# Patient Record
Sex: MALE | Race: WHITE | NOT HISPANIC OR LATINO | Employment: OTHER | URBAN - METROPOLITAN AREA
[De-identification: names, ages, dates, MRNs, and addresses within clinical notes are randomized per-mention and may not be internally consistent; named-entity substitution may affect disease eponyms.]

---

## 2023-06-09 PROBLEM — R11.0 NAUSEA: Status: ACTIVE | Noted: 2023-06-09

## 2023-06-09 PROBLEM — G43.909 MIGRAINE WITHOUT STATUS MIGRAINOSUS, NOT INTRACTABLE: Status: ACTIVE | Noted: 2023-06-09

## 2024-05-03 ENCOUNTER — HOSPITAL ENCOUNTER (EMERGENCY)
Facility: HOSPITAL | Age: 34
Discharge: HOME | End: 2024-05-03
Attending: EMERGENCY MEDICINE
Payer: OTHER GOVERNMENT

## 2024-05-03 VITALS
DIASTOLIC BLOOD PRESSURE: 94 MMHG | SYSTOLIC BLOOD PRESSURE: 139 MMHG | HEIGHT: 72 IN | RESPIRATION RATE: 20 BRPM | HEART RATE: 85 BPM | OXYGEN SATURATION: 98 % | TEMPERATURE: 98 F | BODY MASS INDEX: 27.77 KG/M2 | WEIGHT: 205 LBS

## 2024-05-03 DIAGNOSIS — M54.42 CHRONIC BILATERAL LOW BACK PAIN WITH LEFT-SIDED SCIATICA: Primary | ICD-10-CM

## 2024-05-03 DIAGNOSIS — G89.29 CHRONIC BILATERAL LOW BACK PAIN WITH LEFT-SIDED SCIATICA: Primary | ICD-10-CM

## 2024-05-03 PROBLEM — M54.16 LUMBAR RADICULOPATHY: Status: ACTIVE | Noted: 2024-05-03

## 2024-05-03 LAB
ALBUMIN SERPL BCP-MCNC: 4.2 G/DL (ref 3.5–5.2)
ALP SERPL-CCNC: 63 U/L (ref 55–135)
ALT SERPL W/O P-5'-P-CCNC: 37 U/L (ref 10–44)
ANION GAP SERPL CALC-SCNC: 9 MMOL/L (ref 8–16)
AST SERPL-CCNC: 25 U/L (ref 10–40)
BASOPHILS # BLD AUTO: 0.09 K/UL (ref 0–0.2)
BASOPHILS NFR BLD: 1.1 % (ref 0–1.9)
BILIRUB SERPL-MCNC: 0.4 MG/DL (ref 0.1–1)
BUN SERPL-MCNC: 13 MG/DL (ref 6–20)
CALCIUM SERPL-MCNC: 9.5 MG/DL (ref 8.7–10.5)
CHLORIDE SERPL-SCNC: 103 MMOL/L (ref 95–110)
CO2 SERPL-SCNC: 26 MMOL/L (ref 23–29)
CREAT SERPL-MCNC: 0.9 MG/DL (ref 0.5–1.4)
DIFFERENTIAL METHOD BLD: ABNORMAL
EOSINOPHIL # BLD AUTO: 0.9 K/UL (ref 0–0.5)
EOSINOPHIL NFR BLD: 10.5 % (ref 0–8)
ERYTHROCYTE [DISTWIDTH] IN BLOOD BY AUTOMATED COUNT: 13 % (ref 11.5–14.5)
EST. GFR  (NO RACE VARIABLE): >60 ML/MIN/1.73 M^2
GLUCOSE SERPL-MCNC: 101 MG/DL (ref 70–110)
HCT VFR BLD AUTO: 48 % (ref 40–54)
HGB BLD-MCNC: 16 G/DL (ref 14–18)
IMM GRANULOCYTES # BLD AUTO: 0.1 K/UL (ref 0–0.04)
IMM GRANULOCYTES NFR BLD AUTO: 1.2 % (ref 0–0.5)
LYMPHOCYTES # BLD AUTO: 2 K/UL (ref 1–4.8)
LYMPHOCYTES NFR BLD: 23.3 % (ref 18–48)
MCH RBC QN AUTO: 29.2 PG (ref 27–31)
MCHC RBC AUTO-ENTMCNC: 33.3 G/DL (ref 32–36)
MCV RBC AUTO: 88 FL (ref 82–98)
MONOCYTES # BLD AUTO: 0.7 K/UL (ref 0.3–1)
MONOCYTES NFR BLD: 8.2 % (ref 4–15)
NEUTROPHILS # BLD AUTO: 4.7 K/UL (ref 1.8–7.7)
NEUTROPHILS NFR BLD: 55.7 % (ref 38–73)
NRBC BLD-RTO: 0 /100 WBC
PLATELET # BLD AUTO: 347 K/UL (ref 150–450)
PMV BLD AUTO: 9.7 FL (ref 9.2–12.9)
POTASSIUM SERPL-SCNC: 4 MMOL/L (ref 3.5–5.1)
PROT SERPL-MCNC: 7.8 G/DL (ref 6–8.4)
RBC # BLD AUTO: 5.48 M/UL (ref 4.6–6.2)
SODIUM SERPL-SCNC: 138 MMOL/L (ref 136–145)
WBC # BLD AUTO: 8.5 K/UL (ref 3.9–12.7)

## 2024-05-03 PROCEDURE — 25500020 PHARM REV CODE 255: Performed by: EMERGENCY MEDICINE

## 2024-05-03 PROCEDURE — A9585 GADOBUTROL INJECTION: HCPCS | Performed by: EMERGENCY MEDICINE

## 2024-05-03 PROCEDURE — 80053 COMPREHEN METABOLIC PANEL: CPT | Performed by: EMERGENCY MEDICINE

## 2024-05-03 PROCEDURE — 99285 EMERGENCY DEPT VISIT HI MDM: CPT | Mod: 25

## 2024-05-03 PROCEDURE — 99284 EMERGENCY DEPT VISIT MOD MDM: CPT | Mod: ,,, | Performed by: PHYSICIAN ASSISTANT

## 2024-05-03 PROCEDURE — 25000003 PHARM REV CODE 250: Performed by: EMERGENCY MEDICINE

## 2024-05-03 PROCEDURE — 63600175 PHARM REV CODE 636 W HCPCS: Performed by: EMERGENCY MEDICINE

## 2024-05-03 PROCEDURE — 96374 THER/PROPH/DIAG INJ IV PUSH: CPT | Mod: 59

## 2024-05-03 PROCEDURE — 85025 COMPLETE CBC W/AUTO DIFF WBC: CPT | Performed by: EMERGENCY MEDICINE

## 2024-05-03 RX ORDER — HYDROCODONE BITARTRATE AND ACETAMINOPHEN 5; 325 MG/1; MG/1
1 TABLET ORAL
Status: COMPLETED | OUTPATIENT
Start: 2024-05-03 | End: 2024-05-03

## 2024-05-03 RX ORDER — OXYCODONE HYDROCHLORIDE 5 MG/1
10 TABLET ORAL
Status: COMPLETED | OUTPATIENT
Start: 2024-05-03 | End: 2024-05-03

## 2024-05-03 RX ORDER — KETOROLAC TROMETHAMINE 30 MG/ML
10 INJECTION, SOLUTION INTRAMUSCULAR; INTRAVENOUS
Status: COMPLETED | OUTPATIENT
Start: 2024-05-03 | End: 2024-05-03

## 2024-05-03 RX ORDER — ONDANSETRON 4 MG/1
4 TABLET, ORALLY DISINTEGRATING ORAL EVERY 6 HOURS PRN
Qty: 20 TABLET | Refills: 0 | Status: SHIPPED | OUTPATIENT
Start: 2024-05-03

## 2024-05-03 RX ORDER — OXYCODONE HYDROCHLORIDE 10 MG/1
10 TABLET ORAL EVERY 4 HOURS PRN
Qty: 18 TABLET | Refills: 0 | Status: SHIPPED | OUTPATIENT
Start: 2024-05-03

## 2024-05-03 RX ORDER — ACETAMINOPHEN 500 MG
1000 TABLET ORAL
Status: COMPLETED | OUTPATIENT
Start: 2024-05-03 | End: 2024-05-03

## 2024-05-03 RX ORDER — GADOBUTROL 604.72 MG/ML
10 INJECTION INTRAVENOUS
Status: COMPLETED | OUTPATIENT
Start: 2024-05-03 | End: 2024-05-03

## 2024-05-03 RX ADMIN — KETOROLAC TROMETHAMINE 10 MG: 30 INJECTION, SOLUTION INTRAMUSCULAR; INTRAVENOUS at 12:05

## 2024-05-03 RX ADMIN — GADOBUTROL 10 ML: 604.72 INJECTION INTRAVENOUS at 06:05

## 2024-05-03 RX ADMIN — ACETAMINOPHEN 1000 MG: 500 TABLET ORAL at 12:05

## 2024-05-03 RX ADMIN — HYDROCODONE BITARTRATE AND ACETAMINOPHEN 1 TABLET: 5; 325 TABLET ORAL at 08:05

## 2024-05-03 RX ADMIN — HYDROCODONE BITARTRATE AND ACETAMINOPHEN 1 TABLET: 5; 325 TABLET ORAL at 03:05

## 2024-05-03 RX ADMIN — OXYCODONE 10 MG: 5 TABLET ORAL at 01:05

## 2024-05-03 NOTE — ASSESSMENT & PLAN NOTE
34M w/ PMH of L L5-S1 HNP (s/p microdisc @ OSH) and chronic low back pain who presents with 1 week of severe LBP, L lumbar radiculopathy, and bowel incontinence     --Patient seen by NSGY at bedside  --MRI Lumbar spine wo contrast (4/26) with small left lateral recess disc protrusion at L5-S1  --L lateral disc protrusion at L5-S1 is small and unlikely to cause cauda equina symptoms of bowel incontinence and and saddle anesthesia. Recommend repeat MRI lumbar spine w/wo contrast (contrast needed as patient has significant history of injections)  --Recommend PVR STAT and DTR once private room available and patient is agreeable   --No acute surgical intervention at this time  --Consider admit to medicine if patient's pain is not adequately controlled  --Consider dex 12 mg x 1   --Diazepam 5q6 for muscle spasm  --Pain medications per primary team  --Further treatment recommendations pending MRI lumbar spine completion and review

## 2024-05-03 NOTE — SUBJECTIVE & OBJECTIVE
(Not in a hospital admission)      Review of patient's allergies indicates:  No Known Allergies    No past medical history on file.  No past surgical history on file.  Family History    None       Tobacco Use    Smoking status: Never    Smokeless tobacco: Current     Types: Chew    Tobacco comments:     Can a week   Substance and Sexual Activity    Alcohol use: Not Currently    Drug use: Never    Sexual activity: Yes     Partners: Female     Review of Systems   Constitutional:  Negative for chills, fatigue and fever.   Eyes:  Negative for photophobia and visual disturbance.   Respiratory:  Negative for cough and shortness of breath.    Cardiovascular:  Negative for chest pain, palpitations and leg swelling.   Gastrointestinal:  Negative for constipation, diarrhea, nausea and vomiting.   Genitourinary:  Negative for difficulty urinating, dysuria, frequency and urgency.   Musculoskeletal:  Positive for back pain. Negative for gait problem and neck pain.   Neurological:  Positive for weakness and numbness. Negative for dizziness, seizures, speech difficulty and headaches.   Psychiatric/Behavioral:  Negative for confusion. The patient is not nervous/anxious.      Objective:     Weight: 93 kg (205 lb)  Body mass index is 27.8 kg/m².  Vital Signs (Most Recent):  Temp: 98.1 °F (36.7 °C) (05/03/24 1055)  Pulse: 94 (05/03/24 1055)  Resp: 20 (05/03/24 1334)  BP: (!) 137/100 (05/03/24 1055)  SpO2: 97 % (05/03/24 1055) Vital Signs (24h Range):  Temp:  [98.1 °F (36.7 °C)] 98.1 °F (36.7 °C)  Pulse:  [94] 94  Resp:  [18-20] 20  SpO2:  [97 %] 97 %  BP: (137)/(100) 137/100           Neurosurgery Physical Exam  General: well developed, well nourished, no distress.   Head: normocephalic, atraumatic  Neurologic: Alert and oriented. Thought content appropriate.  Cranial nerves: face symmetric, tongue midline, CN II-XII grossly intact.   Eyes: pupils equal, round, reactive to light with accommodation, EOMI.   Pulmonary: normal  "respirations, no signs of respiratory distress  Skin: Skin is warm, dry and intact.  Sensory: intact to light touch throughout  Motor Strength:Moves all extremities spontaneously with good tone. Proximal RLE exam is pain limited.   Strength  Deltoids Triceps Biceps Wrist Extension Wrist Flexion Hand    Upper: R 5/5 5/5 5/5 5/5 5/5 5/5    L 5/5 5/5 5/5 5/5 5/5 5/5     Iliopsoas Quadriceps Knee  Flexion Tibialis  anterior Gastro- cnemius EHL   Lower: R 4/5 4/5 5/5 5/5 5/5 3/5    L 5/5 5/5 5/5 5/5 5/5 5/5     DTR deferred as patient is in ED hallway and no private rooms available.         Significant Labs:  Recent Labs   Lab 05/03/24  1256         K 4.0      CO2 26   BUN 13   CREATININE 0.9   CALCIUM 9.5     Recent Labs   Lab 05/03/24  1256   WBC 8.50   HGB 16.0   HCT 48.0        No results for input(s): "LABPT", "INR", "APTT" in the last 48 hours.  Microbiology Results (last 7 days)       ** No results found for the last 168 hours. **          All pertinent labs from the last 24 hours have been reviewed.    Significant Diagnostics:  I have reviewed all pertinent imaging results/findings within the past 24 hours.  "

## 2024-05-03 NOTE — CONSULTS
[facility] - Emergency Dept  Neurosurgery  Consult Note    Inpatient consult to Neurosurgery  Consult performed by: Amada Yadav PA-C  Consult ordered by: Lindsey Junior MD        Subjective:     Chief Complaint/Reason for Admission: Lumbar radiculopathy     History of Present Illness: Alex Washburn is a 34 y.o. male with history of chronic low back pain and L L5-S1 HNP s/p L5-S1 microdiscectomy in [place] who presents to ED with acute low back pain and left lumbar radiculopathy. Patient reports symptoms started approximately 1 week ago. He reports severe low back pain radiating to the left anterolateral thigh and down the posterolateral left thigh to the foot. He reports numbness from the left knee to the foot. He also reports episodes of bowel incontinence and decreased sensation to groin. He does report regular bowel movements that he can control. Reports control of urination. He was evaluated at [facility]SH ED on 4/26 and MRI lumbar spine demonstrated small disc protrusion at left paracentral lateral recess on the left at L5-S1. He was discharged home with medrol dosepack which he reports no significant relief. He reports symptoms are stable from that ED visit but have not improved. He does reports sensation when the OSH did a DTR on him. He is established with OSH pain management and most recently had nerve ablation approximately 3 weeks ago which did not help his pain.     (Not in a hospital admission)      Review of patient's allergies indicates:  No Known Allergies    No past medical history on file.  No past surgical history on file.  Family History    None       Tobacco Use    Smoking status: Never    Smokeless tobacco: Current     Types: Chew    Tobacco comments:     Can a week   Substance and Sexual Activity    Alcohol use: Not Currently    Drug use: Never    Sexual activity: Yes     Partners: Female     Review of Systems   Constitutional:  Negative for chills, fatigue and fever.   Eyes:  Negative  for photophobia and visual disturbance.   Respiratory:  Negative for cough and shortness of breath.    Cardiovascular:  Negative for chest pain, palpitations and leg swelling.   Gastrointestinal:  Negative for constipation, diarrhea, nausea and vomiting.   Genitourinary:  Negative for difficulty urinating, dysuria, frequency and urgency.   Musculoskeletal:  Positive for back pain. Negative for gait problem and neck pain.   Neurological:  Positive for weakness and numbness. Negative for dizziness, seizures, speech difficulty and headaches.   Psychiatric/Behavioral:  Negative for confusion. The patient is not nervous/anxious.      Objective:     Weight: 93 kg (205 lb)  Body mass index is 27.8 kg/m².  Vital Signs (Most Recent):  Temp: 98.1 °F (36.7 °C) (05/03/24 1055)  Pulse: 94 (05/03/24 1055)  Resp: 20 (05/03/24 1334)  BP: (!) 137/100 (05/03/24 1055)  SpO2: 97 % (05/03/24 1055) Vital Signs (24h Range):  Temp:  [98.1 °F (36.7 °C)] 98.1 °F (36.7 °C)  Pulse:  [94] 94  Resp:  [18-20] 20  SpO2:  [97 %] 97 %  BP: (137)/(100) 137/100           Neurosurgery Physical Exam  General: well developed, well nourished, no distress.   Head: normocephalic, atraumatic  Neurologic: Alert and oriented. Thought content appropriate.  Cranial nerves: face symmetric, tongue midline, CN II-XII grossly intact.   Eyes: pupils equal, round, reactive to light with accommodation, EOMI.   Pulmonary: normal respirations, no signs of respiratory distress  Skin: Skin is warm, dry and intact.  Sensory: intact to light touch throughout  Motor Strength:Moves all extremities spontaneously with good tone. Proximal RLE exam is pain limited.   Strength  Deltoids Triceps Biceps Wrist Extension Wrist Flexion Hand    Upper: R 5/5 5/5 5/5 5/5 5/5 5/5    L 5/5 5/5 5/5 5/5 5/5 5/5     Iliopsoas Quadriceps Knee  Flexion Tibialis  anterior Gastro- cnemius EHL   Lower: R 4/5 4/5 5/5 5/5 5/5 3/5    L 5/5 5/5 5/5 5/5 5/5 5/5     DTR deferred as patient is in ED  "hallway and no private rooms available.         Significant Labs:  Recent Labs   Lab 05/03/24  1256         K 4.0      CO2 26   BUN 13   CREATININE 0.9   CALCIUM 9.5     Recent Labs   Lab 05/03/24  1256   WBC 8.50   HGB 16.0   HCT 48.0        No results for input(s): "LABPT", "INR", "APTT" in the last 48 hours.  Microbiology Results (last 7 days)       ** No results found for the last 168 hours. **          All pertinent labs from the last 24 hours have been reviewed.    Significant Diagnostics:  I have reviewed all pertinent imaging results/findings within the past 24 hours.  Assessment/Plan:     Lumbar radiculopathy  34M w/ PMH of L L5-S1 HNP (s/p microdisc @ OSH) and chronic low back pain who presents with 1 week of severe LBP, L lumbar radiculopathy, and bowel incontinence     --Patient seen by NSGY at bedside  --MRI Lumbar spine wo contrast (4/26) with small left lateral recess disc protrusion at L5-S1  --L lateral disc protrusion at L5-S1 is small and unlikely to cause cauda equina symptoms of bowel incontinence and and saddle anesthesia. Recommend repeat MRI lumbar spine w/wo contrast (contrast needed as patient has significant history of injections)  --Recommend PVR STAT and DTR once private room available and patient is agreeable   --No acute surgical intervention at this time  --Consider admit to medicine if patient's pain is not adequately controlled  --Consider dex 12 mg x 1   --Diazepam 5q6 for muscle spasm  --Pain medications per primary team  --Further treatment recommendations pending MRI lumbar spine completion and review         Thank you for your consult. I will follow-up with patient. Please contact us if you have any additional questions.    Amada Yadav PA-C  Neurosurgery  [facility] - Emergency Dept  "

## 2024-05-03 NOTE — ED TRIAGE NOTES
Alex Washburn, a 34 y.o. male presents to the ED w/ complaint of lower back pain starting last Friday. Patient states it is a shooting pain worse down left leg.     Triage note:  Chief Complaint   Patient presents with    Back Pain     States herniated L5 S1, incontinent of stool Friday and sat, none since, pain management cancelled me today     Review of patient's allergies indicates:  No Known Allergies  No past medical history on file.

## 2024-05-03 NOTE — ED NOTES
Patient identifiers for Alex Washburn 34 y.o. male checked and correct.  Chief Complaint   Patient presents with    Back Pain     States herniated L5 S1, incontinent of stool Friday and sat, none since, pain management cancelled me today     No past medical history on file.  Allergies reported: Review of patient's allergies indicates:  No Known Allergies    Appearance: Pt awake, alert & oriented to person, place & time. Pt in no acute distress at present time. Pt is clean and well groomed with clothes appropriately fastened.   Skin: Skin warm, dry & intact. Color consistent with ethnicity. Mucous membranes moist. No breakdown or brusing noted.   Musculoskeletal: left lower back pain. Pain is constant and worse on the left leg. Patient describes pain as shooting pain.   Respiratory: Respirations spontaneous, even, and non-labored. Visible chest rise noted. Airway is open and patent. No accessory muscle use noted.   Neurologic: Sensation is intact. Speech is clear and appropriate. Eyes open spontaneously, behavior appropriate to situation, follows commands, facial expression symmetrical, bilateral hand grasp equal and even, purposeful motor response noted.  Cardiac: All peripheral pulses present. No Bilateral lower extremity edema. Cap refill is <3 seconds.  Abdomen: Abdomen soft, non distended, non tender to palpation.   : Pt voids independently, denies dysuria, hematuria, frequency.

## 2024-05-03 NOTE — HPI
Alex Washburn is a 34 y.o. male with history of chronic low back pain and L L5-S1 HNP s/p L5-S1 microdiscectomy in [place] who presents to ED with acute low back pain and left lumbar radiculopathy. Patient reports symptoms started approximately 1 week ago. He reports severe low back pain radiating to the left anterolateral thigh and down the posterolateral left thigh to the foot. He reports numbness from the left knee to the foot. He also reports episodes of bowel incontinence and decreased sensation to groin. He does report regular bowel movements that he can control. Reports control of urination. He was evaluated at [facility] ED on 4/26 and MRI lumbar spine demonstrated small disc protrusion at left paracentral lateral recess on the left at L5-S1. He was discharged home with medrol dosepack which he reports no significant relief. He reports symptoms are stable from that ED visit but have not improved. He does reports sensation when the OSH did a DTR on him. He is established with OSH pain management and most recently had nerve ablation approximately 3 weeks ago which did not help his pain.

## 2024-05-03 NOTE — ED PROVIDER NOTES
Encounter Date: 5/3/2024       History     Chief Complaint   Patient presents with    Back Pain     States herniated L5 S1, incontinent of stool Friday and sat, none since, pain management cancelled me today     34-year-old male, history of low back pain, complaining of low back pain for 1 week.  Pain is located in his left lower back, it radiates down his left thigh and occasionally to his left foot.  The pain is severe and not relieved with over-the-counter ibuprofen.  Patient was seen in an [facility] about a week ago, a day after symptom onset.  He was reporting bowel incontinence in addition to the severe pain so a CT L-spine and MRI L-spine were performed.  MRI showed a left L5-S1 disc herniation so patient was discharged with a Medrol Dosepak which she has been taking without relief.  He is reporting 1 more episode of bowel incontinence 3 days ago.  He also reports decreased sensation to his left foot.  He does not have weakness in his extremities but he does have limitation and ambulation secondary to pain.  He does not have bowel incontinence or urinary retention.    The history is provided by the patient.     Review of patient's allergies indicates:  No Known Allergies  No past medical history on file.  No past surgical history on file.  No family history on file.  Social History     Tobacco Use    Smoking status: Never    Smokeless tobacco: Current     Types: Chew    Tobacco comments:     Can a week   Substance Use Topics    Alcohol use: Not Currently    Drug use: Never     Review of Systems    Physical Exam     Initial Vitals [05/03/24 1055]   BP Pulse Resp Temp SpO2   (!) 137/100 94 18 98.1 °F (36.7 °C) 97 %      MAP       --         Physical Exam    Nursing note and vitals reviewed.  Constitutional: Vital signs are normal. He appears well-developed and well-nourished.  Non-toxic appearance. He does not appear ill.   HENT:   Head: Normocephalic and atraumatic.   Mouth/Throat: Mucous  membranes are normal. Mucous membranes are not dry.   Eyes: Conjunctivae and lids are normal.   Neck: Neck supple.   Normal range of motion.  Cardiovascular:  Normal rate.           Musculoskeletal:      Cervical back: Normal range of motion and neck supple.        Back:      Neurological: He is alert and oriented to person, place, and time.   Hip flexion, lower leg flexion and extension all 5/5.  Left foot dorsiflexion and plantar flexion seem to be slightly diminished at 4/5  Sensation diminished to light touch over the left lateral foot  Patellar reflexes 2+ bilaterally   Skin: Skin is dry and intact. No pallor.   Psychiatric: He has a normal mood and affect. His speech is normal and behavior is normal.         ED Course   Procedures  Labs Reviewed   CBC W/ AUTO DIFFERENTIAL - Abnormal; Notable for the following components:       Result Value    Immature Granulocytes 1.2 (*)     Immature Grans (Abs) 0.10 (*)     Eos # 0.9 (*)     Eosinophil % 10.5 (*)     All other components within normal limits   COMPREHENSIVE METABOLIC PANEL          Imaging Results              MRI Lumbar Spine W WO Cont (Final result)  Result time 05/03/24 19:52:18      Final result by Humberto Pacheco MD (05/03/24 19:52:18)                   Impression:      1. No acute abnormality.  2. Degenerative changes at L5-S1 with severe disc space narrowing and small central/left paracentral disc protrusion.  This could potentially affect the left S1 nerve root.  There are postoperative changes with enhancing scar tissue on the left also.  Recommend clinical correlation.      Electronically signed by: Humberto Pacheco  Date:    05/03/2024  Time:    19:52               Narrative:    EXAMINATION:  MRI LUMBAR SPINE W WO CONTRAST    CLINICAL HISTORY:  Low back pain, progressive neurologic deficit;    TECHNIQUE:  Multiplanar, multisequence MR images were acquired from the thoracolumbar junction to the sacrum without the administration of  contrast.    COMPARISON:  04/26/2024    FINDINGS:  See study report 1 week prior also.    Alignment: Normal.    Vertebrae: Normal marrow signal. No fracture.    Discs: Severe disc space narrowing and desiccation at L5-S1.  The discs are adequately maintained elsewhere.    Cord: Normal.  Conus terminates at L1    Degenerative findings:    T12-L1: No significant abnormality.    L1-L2: No significant abnormality.    L2-L3: No significant abnormality.    L3-L4: No significant abnormality.    L4-L5: No significant abnormality.    L5-S1: Mild diffuse posterior disc osteophyte complex with minimal central/left paracentral disc protrusion there is adjacent scar tissue.  This could potentially affect the left S1 nerve root.  Recommend clinical correlation.  No significant central canal or foraminal narrowing.  Left L5-S1 hemilaminectomy    Paraspinal muscles & soft tissues: Unremarkable.                                       Medications   acetaminophen tablet 1,000 mg (1,000 mg Oral Given 5/3/24 1253)   ketorolac injection 9.999 mg (9.999 mg Intravenous Given 5/3/24 1252)   oxyCODONE immediate release tablet 10 mg (10 mg Oral Given 5/3/24 1334)   HYDROcodone-acetaminophen 5-325 mg per tablet 1 tablet (1 tablet Oral Given 5/3/24 1553)   gadobutroL (GADAVIST) injection 10 mL (10 mLs Intravenous Given 5/3/24 1838)   HYDROcodone-acetaminophen 5-325 mg per tablet 1 tablet (1 tablet Oral Given 5/3/24 2023)     Medical Decision Making  Patient with known L5-S1 left disc herniation with radiculopathy since past week.  It is unclear based on the history and exam whether symptoms have progressed neurologically since last week as exam somewhat limited by pain.  No infectious symptoms to suggest epidural abscess or diskitis.  Unfortunately, patient has been on a Medrol Dosepak without any relief.    Plan  -labs  -repeat MRI  -pain control  -neurosurgery consulted to assist with management, they have already evaluated the patient and  agree with plan for repeat MRI    Amount and/or Complexity of Data Reviewed  External Data Reviewed: radiology.     Details: MRI L-spine, April 26  Small disc protrusion left paracentral lateral recess on the left at L5-S1 with surrounding granulation type tissue.  Mild mass effect on the traversing S1 nerve root on the left with no significant compression of the traversing S2 nerve root on the left.  Labs: ordered.  Radiology: ordered.  Discussion of management or test interpretation with external provider(s): Neurosurgery PA    Risk  OTC drugs.  Prescription drug management.  Decision regarding hospitalization.                                      Clinical Impression:  Final diagnoses:  [M54.42, G89.29] Chronic bilateral low back pain with left-sided sciatica (Primary)          ED Disposition Condition    Discharge Stable          ED Prescriptions       Medication Sig Dispense Start Date End Date Auth. Provider    oxyCODONE (ROXICODONE) 10 mg Tab immediate release tablet Take 1 tablet (10 mg total) by mouth every 4 (four) hours as needed for Pain. 18 tablet 5/3/2024 -- Anthony Lin MD    ondansetron (ZOFRAN-ODT) 4 MG TbDL Take 1 tablet (4 mg total) by mouth every 6 (six) hours as needed. 20 tablet 5/3/2024 -- Anthony Lin MD          Follow-up Information       Follow up With Specialties Details Why Contact Info    See pain doctor in 10 days        Benjamín Chavez MD Neurosurgery   [address] [facility] [address]  [phone]      [facility] - Emergency Dept Emergency Medicine  Return to ED for worsening symptoms, inability to eat/drink, fever greater than 100.4, or any other concerns. [address] [facility] [id number] [phone]             Lindsey Junior MD  05/05/24 [id number]

## 2024-05-04 NOTE — DISCHARGE INSTRUCTIONS
Discuss elavil with pain doctor.    Use naproxen 500mg twice a day with meals.    Our goal in the emergency department is to always give you outstanding care and exceptional service. You may receive a survey by mail or e-mail in the next week regarding your experience in our ED. We would greatly appreciate your completing and returning the survey. Your feedback provides us with a way to recognize our staff who give very good care and it helps us learn how to improve when your experience was below our aspiration of excellence.

## 2025-03-31 NOTE — PROGRESS NOTES
DATE: 3/31/2025  PATIENT: Alex Washburn    Supervising Physician: Jak Gilbert M.D.    CHIEF COMPLAINT: low back and bilateral leg pain    HISTORY:  Alex Washburn is a 34 y.o. male sp L5-S1 discectomy here for initial evaluation of low back and bilateral (L>R) leg pain (Back - 6, Leg - 6).  The pain in the lower back and left leg is what bothers him most.  The pain has been present for years. The patient describes the pain as aching in his back and radiating down his legs.  The pain is worse with activity and improved by rest. There is positive associated numbness and tingling. There is negative subjective weakness. Pt had an L5-S1 discectomy in [place] about 2 years ago with decent relief. Since  then he has tried gabapentin, NSAIDs, PT, and multiple ESIs (last one 3-4 months ago with decent relief).    The patient denies myelopathic symptoms such as handwriting changes or difficulty with buttons/coins/keys. Denies perineal paresthesias, ENDORSES intermittent bowel/bladder dysfunction.    PAST MEDICAL/SURGICAL HISTORY:  No past medical history on file.  No past surgical history on file.    Medications:   Medications Ordered Prior to Encounter[1]    Social History: Social History[2]    REVIEW OF SYSTEMS:  Constitution: Negative. Negative for chills, fever and night sweats.   Cardiovascular: Negative for chest pain and syncope.   Respiratory: Negative for cough and shortness of breath.   Gastrointestinal: See HPI. Negative for nausea/vomiting. Negative for abdominal pain.  Genitourinary: See HPI. Negative for discoloration or dysuria.  Skin: Negative for dry skin, itching and rash.   Hematologic/Lymphatic: Negative for bleeding problem. Does not bruise/bleed easily.   Musculoskeletal: Negative for falls and muscle weakness.   Neurological: See HPI. No seizures.   Endocrine: Negative for polydipsia, polyphagia and polyuria.   Allergic/Immunologic: Negative for hives and persistent infections.     EXAM:  There  "were no vitals taken for this visit.    General: The patient is a very pleasant 34 y.o. male in no apparent distress, the patient is oriented to person, place and time.  Psych: Normal mood and affect  HEENT: Vision grossly intact, hearing intact to the spoken word.  Lungs: Respirations unlabored.  Gait: Normal station and gait, no difficulty with toe or heel walk.   Skin: Dorsal lumbar skin negative for rashes, lesions, hairy patches. There is negative lumbar tenderness to palpation.  Range of motion: Lumbar range of motion is acceptable.  Spinal Balance: Global saggital and coronal spinal balance acceptable, not significant for scoliosis and kyphosis.  Musculoskeletal: No pain with the range of motion of the bilateral hips. No trochanteric tenderness to palpation.  Vascular: Bilateral lower extremities warm and well perfused, dorsalis pedis pulses 2+ bilaterally.  Neurological: Normal strength and tone in all major motor groups in the bilateral lower extremities. Altered sensation to light touch in the L2-S1 dermatomes in LLE compared to RLE  Deep tendon reflexes symmetric 2+ in the bilateral lower extremities.  Negative Babinski bilaterally. Straight leg raise negative bilaterally.    IMAGING:      Today I personally reviewed AP, Lat and Flex/Ex  upright L-spine films that demonstrate mild degenerative changes at L5-S1      MRI lumbar (2024) demonstrates left paracentral disc bulge and scar tissue at L5-S1    There is no height or weight on file to calculate BMI.    No results found for: "HGBA1C"        ASSESSMENT/PLAN:    There are no diagnoses linked to this encounter.    Today we discussed at length all of the different treatment options including anti-inflammatories, acetaminophen, rest, ice, heat, physical therapy including strengthening and stretching exercises, home exercises, ROM, aerobic conditioning, aqua therapy, other modalities including ultrasound, massage, and dry needling, epidural steroid " injections and finally surgical intervention.      Pt sp L5-S1 discectomy presents with chronic low back pain and radiculopathy. Will send lyrica to pharmacy and get pt established with [facility] for possible continued ESIs vs MBBs/RFAs. Will discuss possible surgical options with Dr. Gilbert.        [1]   Current Outpatient Medications on File Prior to Visit   Medication Sig Dispense Refill    amoxicillin-clavulanate 875-125mg (AUGMENTIN) 875-125 mg per tablet Take 1 tablet by mouth 2 (two) times daily. 14 tablet 0    fluticasone propionate (FLONASE) 50 mcg/actuation nasal spray 1 spray (50 mcg total) by Each Nostril route 2 (two) times daily as needed. 15 g 0    ibuprofen (ADVIL,MOTRIN) 600 MG tablet Take 1 tablet (600 mg total) by mouth every 6 (six) hours as needed for Pain. 20 tablet 0    ondansetron (ZOFRAN) 4 MG tablet Take 1 tablet (4 mg total) by mouth every 6 (six) hours. 12 tablet 0    ondansetron (ZOFRAN-ODT) 4 MG TbDL Take 1 tablet (4 mg total) by mouth every 6 (six) hours as needed. 20 tablet 0    oxyCODONE (ROXICODONE) 10 mg Tab immediate release tablet Take 1 tablet (10 mg total) by mouth every 4 (four) hours as needed for Pain. 18 tablet 0    predniSONE (DELTASONE) 10 MG tablet Take 1 tablet (10 mg total) by mouth once daily. Take 4 tabs x 3 days, then take 2 tabs x 3 days, then take 1 tab x 3 days. 21 tablet 0    sumatriptan (IMITREX) 25 MG Tab Take 50 mg by mouth every 2 (two) hours as needed.       No current facility-administered medications on file prior to visit.   [2]   Social History  Socioeconomic History    Marital status:    Tobacco Use    Smoking status: Never    Smokeless tobacco: Current     Types: Chew    Tobacco comments:     Can a week   Substance and Sexual Activity    Alcohol use: Not Currently    Drug use: Never    Sexual activity: Yes     Partners: Female

## 2025-04-03 ENCOUNTER — OFFICE VISIT (OUTPATIENT)
Dept: ORTHOPEDICS | Facility: CLINIC | Age: 35
End: 2025-04-03
Payer: OTHER GOVERNMENT

## 2025-04-03 VITALS — BODY MASS INDEX: 27.55 KG/M2 | HEIGHT: 73 IN | WEIGHT: 207.88 LBS

## 2025-04-03 DIAGNOSIS — M47.816 LUMBAR SPONDYLOSIS: ICD-10-CM

## 2025-04-03 PROCEDURE — 99204 OFFICE O/P NEW MOD 45 MIN: CPT | Mod: S$PBB,,, | Performed by: ORTHOPAEDIC SURGERY

## 2025-04-03 PROCEDURE — 99999 PR PBB SHADOW E&M-EST. PATIENT-LVL III: CPT | Mod: PBBFAC,,, | Performed by: ORTHOPAEDIC SURGERY

## 2025-04-03 PROCEDURE — 99213 OFFICE O/P EST LOW 20 MIN: CPT | Mod: PBBFAC | Performed by: ORTHOPAEDIC SURGERY

## 2025-04-03 RX ORDER — PREGABALIN 100 MG/1
100 CAPSULE ORAL 2 TIMES DAILY
Qty: 60 CAPSULE | Refills: 5 | Status: SHIPPED | OUTPATIENT
Start: 2025-04-03 | End: 2025-10-02

## 2025-04-16 ENCOUNTER — TELEPHONE (OUTPATIENT)
Dept: PAIN MEDICINE | Facility: CLINIC | Age: 35
End: 2025-04-16
Payer: OTHER GOVERNMENT

## 2025-04-16 NOTE — TELEPHONE ENCOUNTER
Spoke with patient. Confirmed appointment location & time on 04/23/25  with Dr. Nick.  Patient expressed understanding & gratitude. Call ended.

## 2025-04-22 NOTE — PROGRESS NOTES
"DATE: 5/1/2025  PATIENT: Alex Washburn    Attending Physician: Jak Gilbert M.D.    HISTORY:  Alex Washburn is a 35 y.o. male who returns to me today for follow up.  He was last seen by me 4/3/2025.  Today he is doing well but notes he continues to have low back and bilateral (L>R) leg pain (Back - 6, Leg - 6).  The pain in the lower back and left leg is what bothers him most.  The pain has been present for years. The patient describes the pain as aching in his back and radiating down his legs.  The pain is worse with activity and improved by rest. There is positive associated numbness and tingling. There is negative subjective weakness. Pt had an L5-S1 discectomy in [place] about 2 years ago with decent relief. Since  then he has tried gabapentin, NSAIDs, PT, and multiple ESIs (last one 3-4 months ago with decent relief).     The Patient denies myelopathic symptoms such as handwriting changes or difficulty with buttons/coins/keys. Denies perineal paresthesias, ENDORSES bowel/bladder dysfunction.      EXAM:  There were no vitals taken for this visit.    My physical examination was notable for the following findings:     Musculoskeletal and neuro exam stable    IMAGING:    Today I personally re- reviewed AP, Lat and Flex/Ex  upright L-spine that demonstrate mild degenerative changes at L5-S1     MRI lumbar demonstrates left paracentral disc bulge and scar tissue at L5-S1. No spinal stenosis.       There is no height or weight on file to calculate BMI.    No results found for: "HGBA1C"      ASSESSMENT/PLAN:    There are no diagnoses linked to this encounter.    Today we discussed at length all of the different treatment options including anti-inflammatories, acetaminophen, rest, ice, heat, physical therapy including strengthening and stretching exercises, home exercises, ROM, aerobic conditioning, aqua therapy, other modalities including ultrasound, massage, and dry needling, epidural steroid injections and " finally surgical intervention.      Pt presents with chronic low back pain and radiculopathy. Dr. Gilbert does not see a great surgical option at this time. Pain mgmt would like patient to see neurology regarding incontinence, he is scheduled next week.

## 2025-04-23 ENCOUNTER — OFFICE VISIT (OUTPATIENT)
Dept: PAIN MEDICINE | Facility: CLINIC | Age: 35
End: 2025-04-23
Attending: ANESTHESIOLOGY
Payer: OTHER GOVERNMENT

## 2025-04-23 VITALS
BODY MASS INDEX: 28.72 KG/M2 | WEIGHT: 216.69 LBS | SYSTOLIC BLOOD PRESSURE: 141 MMHG | HEIGHT: 73 IN | HEART RATE: 84 BPM | DIASTOLIC BLOOD PRESSURE: 80 MMHG

## 2025-04-23 DIAGNOSIS — M54.16 LUMBAR RADICULOPATHY: ICD-10-CM

## 2025-04-23 DIAGNOSIS — M51.360 DEGENERATION OF INTERVERTEBRAL DISC OF LUMBAR REGION WITH DISCOGENIC BACK PAIN: Primary | ICD-10-CM

## 2025-04-23 DIAGNOSIS — M51.362 DEGENERATION OF INTERVERTEBRAL DISC OF LUMBAR REGION WITH DISCOGENIC BACK PAIN AND LOWER EXTREMITY PAIN: ICD-10-CM

## 2025-04-23 DIAGNOSIS — M54.9 DORSALGIA, UNSPECIFIED: ICD-10-CM

## 2025-04-23 PROCEDURE — 99204 OFFICE O/P NEW MOD 45 MIN: CPT | Mod: S$PBB,,, | Performed by: ANESTHESIOLOGY

## 2025-04-23 PROCEDURE — 99999 PR PBB SHADOW E&M-EST. PATIENT-LVL III: CPT | Mod: PBBFAC,,, | Performed by: ANESTHESIOLOGY

## 2025-04-23 PROCEDURE — 99213 OFFICE O/P EST LOW 20 MIN: CPT | Mod: PBBFAC | Performed by: ANESTHESIOLOGY

## 2025-04-23 RX ORDER — TIZANIDINE 4 MG/1
4 TABLET ORAL 2 TIMES DAILY
Qty: 60 TABLET | Refills: 1 | Status: SHIPPED | OUTPATIENT
Start: 2025-04-23 | End: 2025-06-22

## 2025-04-23 RX ORDER — METHYLPREDNISOLONE 4 MG/1
TABLET ORAL
Qty: 21 EACH | Refills: 0 | Status: CANCELLED | OUTPATIENT
Start: 2025-04-23

## 2025-04-23 RX ORDER — PREGABALIN 150 MG/1
150 CAPSULE ORAL 2 TIMES DAILY
Qty: 60 CAPSULE | Refills: 6 | Status: CANCELLED | OUTPATIENT
Start: 2025-04-23 | End: 2025-10-22

## 2025-04-23 RX ORDER — CELECOXIB 200 MG/1
200 CAPSULE ORAL 2 TIMES DAILY
Qty: 60 CAPSULE | Refills: 0 | Status: SHIPPED | OUTPATIENT
Start: 2025-04-23

## 2025-04-23 RX ORDER — PREGABALIN 150 MG/1
150 CAPSULE ORAL 2 TIMES DAILY
Qty: 60 CAPSULE | Refills: 6 | Status: SHIPPED | OUTPATIENT
Start: 2025-04-23 | End: 2025-10-22

## 2025-04-23 NOTE — PROGRESS NOTES
Subjective:      Patient ID: Alex Washburn is a 35 y.o. male.    Chief Complaint: No chief complaint on file.    Referred by: Vivien Modi,*     Initial HPI (4/23/2025): Alex Washburn Is presenting to the clinic complaining of lower back pain.  Patient describes the pain as achy, sharp, dull and sore with radiation down the left lower extremity all the way to his foot and down the right lower extremity to his knees.  Patient said the pain is worse with prolonged standing, prolonged walking, sitting and laying flat in bed.  Patient said he has had a diskectomy at L5/S1 in the past which helped with some of his symptoms.  He also had limited relief with epidural injection at an outside pain clinic.  Patient trailed gabapentin and pregabalin and short-term courses of opioids ( Percocets and oxycodone) and reports limited relief.   Patient endorses multiple ED visits for 4 through pain and said he has several episodes of urinary and bowel incontinence.  He presented to the neurosurgery clinic for evaluation and no surgical intervention was recommended and was subsequently referred to the pain Clinic.  Denied any fevers, weight loss.  No recent trauma or falls.  No loss of vision, saddle anesthesia, difficulties with balance or ambulation.    Pain Interventions:  MAIK at L4/5 and L5/S1 at outside facility.     Relevant Surgeries:  L5/S1 Hemidisectomy     Conservative Treatment  - Physical Therpy: No   - Home Exercise Program: Yes  - Chiropractic: No     Medications  Gabapentin - no relief   Pregabalin 100 mg BID  Percocet - Outside provider   Oxycodone - Outside provider      Blood thinners: None     Relevant Imaging  MRI LUMBAR SPINE W WO CONTRAST     FINDINGS:  See study report 1 week prior also.     Alignment: Normal.     Vertebrae: Normal marrow signal. No fracture.     Discs: Severe disc space narrowing and desiccation at L5-S1.  The discs are adequately maintained elsewhere.     Cord: Normal.   "Conus terminates at L1     Degenerative findings:     T12-L1: No significant abnormality.     L1-L2: No significant abnormality.     L2-L3: No significant abnormality.     L3-L4: No significant abnormality.     L4-L5: No significant abnormality.     L5-S1: Mild diffuse posterior disc osteophyte complex with minimal central/left paracentral disc protrusion there is adjacent scar tissue.  This could potentially affect the left S1 nerve root.  Recommend clinical correlation.  No significant central canal or foraminal narrowing.  Left L5-S1 hemilaminectomy     Paraspinal muscles & soft tissues: Unremarkable.     Impression:     1. No acute abnormality.  2. Degenerative changes at L5-S1 with severe disc space narrowing and small central/left paracentral disc protrusion.  This could potentially affect the left S1 nerve root.  There are postoperative changes with enhancing scar tissue on the left also.  Recommend clinical correlation.       XR LUMBAR SPINE AP AND LATERAL    FINDINGS:  Alignment: Alignment is maintained.     Vertebrae: Vertebral body heights are maintained.  No suspicious appearing lytic or blastic lesions.     Discs and facets: L5-S1 disc space narrowing and spondylitic spurring.  Posterior elements intact.     Impression:     L5-S1 spondylosis    History reviewed. No pertinent past medical history.    History reviewed. No pertinent surgical history.    Review of patient's allergies indicates:  No Known Allergies    Current Medications[1]    No family history on file.    Social History[2]      Review of Systems   Constitutional: Negative.   HENT: Negative.     Eyes: Negative.    Cardiovascular: Negative.    Respiratory: Negative.     Endocrine: Negative.    Skin: Negative.    Musculoskeletal:  Positive for back pain.   Gastrointestinal: Negative.    Genitourinary: Negative.    Neurological: Negative.    Psychiatric/Behavioral: Negative.         Objective:   BP (!) 141/80   Pulse 84   Ht 6' 1" (1.854 m)  "  Wt 98.3 kg (216 lb 11.4 oz)   BMI 28.59 kg/m²   Pain Disability Index Review:       No data to display              PHYSICAL EXAM   Vitals:    04/23/25 1455   BP: (!) 141/80   Pulse: 84     GENERAL: Alert and Oriented x3  H&N: NC/AT  CV/PULM: Regular rate. Normal WOB. Symmetric chest rise. No peripheral edema.  SKIN: Intact. No visible rashes or lesions.     MUSCULOSKELETAL/NEUROLGIC:  INSPECTION: Limbs are grossly symmetric. Normal muscle bulk for age. No obvious deformities.  SENSORY: Intact and symmetric to light touch distally in BUE and BLE  MOTOR: Upper Extremity Manual Muscle Testing (*= weakness due to pain)   Right Left   Elbow Flexion 5/5 5/5   Elbow Extension 5/5 5/5   Wrist Extension 5/5 5/5   Finger Flexion () 5/5 5/5   Finger Abduction 5/5 5/5   Shoulder Abduction 5/5 5/5   Shoulder Flexion 5/5 5/5     Lower Extremity Manual Muscle Testing   Right Left   Hip Flexion 5/5 5/5   Knee Extension 5/5 5/5   Ankle Dorsiflexion 5/5 5/5   Ankle Plantarflexion 5/5 5/5   Great Toe Extension (EHL) 5/5 5/5   Knee Flexion 5/5 5/5     DTR:    Right Left   Biceps 2 2   Brachioradialis 2 2   Triceps 2 2   Patellar 2 2   Achilles 2 2     Upper Track Signs:    Right Left   Li's  Negative Negative   Babinski Response Down Going  Down Going    Ankle Clonus No Clonus No Clonus      CERVICAL SPINE:   INSPECTION: No obvious abnormalities.  PALPATION: Non tender to palpation.   ROM: No limited ROM  Spurling's: Negative  Facet Loading: Negative  Llhermitte's: negative Negative    LUMBAR SPINE:  INSPECTION: No gross atrophy or deformity   PALPATION: Tender to palpation over lumbar facet joint.   ROM: Limited with pain   SLR: Negative  Femoral Stretch: Positive   Facet loading: Positive   FADIR: Negative   TESFAYE: Positive   Sacral Distraction: Negative  SI Thigh Thrust: Negative   Sacral compression: Negative   Sacral Thrust: Negative  Gaenslen's: Negative   Yeomen's: Positive   Mailgram: Positive     (Knee, Shoulder,  Ankle, Elbow, Hand)  INSPECTION: Normal  PALPATION: Non tender  ROM: wnl     Ortho/SPM Exam      Assessment:       Encounter Diagnoses   Name Primary?    Degeneration of intervertebral disc of lumbar region with discogenic back pain Yes    Lumbar radiculopathy          Plan:   We discussed with the patient the assessment and recommendations. The following is the plan we agreed on:        Diagnoses and all orders for this visit:    Degeneration of intervertebral disc of lumbar region with discogenic back pain    Lumbar radiculopathy    Other orders  -     celecoxib (CELEBREX) 200 MG capsule; Take 1 capsule (200 mg total) by mouth 2 (two) times daily.  -     tiZANidine (ZANAFLEX) 4 MG tablet; Take 1 tablet (4 mg total) by mouth 2 (two) times a day.  The following orders have not been finalized:  -     Cancel: pregabalin (LYRICA) 150 MG capsule  -     methylPREDNISolone (MEDROL DOSEPACK) 4 mg tablet  -     pregabalin (LYRICA) 150 MG capsule       Images reviewed and discussed with patient.   No interventional procedures recommended at this time due to presence of urinary and bowel incontinence. Patient needs NS and Neurology evaluation for clearance.    reviewed. Opioid prescribed in the past by outside providers. No opioid recommended for non surgical lower back pain. Counseled patient.   Increased Pregabalin from 100 mg BIG to 150 mg BID.   Prescribed 200 mg Celebrex BID PRN. No Contraindications to NSAID. Precautions given.   Prescribed 4 mg Tizanidine BID PRN. Instruction and Precautions given.   Neurology referral.   Follow up after clearance by NS and Neurology for consideration for future Interventional procedures.     Mane Best MD PGY-5  Interventional Pain Medicine Fellow   [facility]     I have personally taken the history and examined this patient and agree with the fellow's note as stated above.     We are concerned about his bowel and bladder incontinence symptomatology.  We will  request Neurology to evaluate and a follow-up with neurosurgery.  Also, his MRI from last year showed some clumping around the left L5-S1 region related to previous surgery.  We will need to repeat the MRI with contrast to evaluate for the extent of arachnoiditis that may be leading to some of his symptomatology.         [1]   Current Outpatient Medications   Medication Sig Dispense Refill    amoxicillin-clavulanate 875-125mg (AUGMENTIN) 875-125 mg per tablet Take 1 tablet by mouth 2 (two) times daily. 14 tablet 0    celecoxib (CELEBREX) 200 MG capsule Take 1 capsule (200 mg total) by mouth 2 (two) times daily. 60 capsule 0    fluticasone propionate (FLONASE) 50 mcg/actuation nasal spray 1 spray (50 mcg total) by Each Nostril route 2 (two) times daily as needed. 15 g 0    ibuprofen (ADVIL,MOTRIN) 600 MG tablet Take 1 tablet (600 mg total) by mouth every 6 (six) hours as needed for Pain. 20 tablet 0    ondansetron (ZOFRAN) 4 MG tablet Take 1 tablet (4 mg total) by mouth every 6 (six) hours. 12 tablet 0    ondansetron (ZOFRAN-ODT) 4 MG TbDL Take 1 tablet (4 mg total) by mouth every 6 (six) hours as needed. 20 tablet 0    oxyCODONE (ROXICODONE) 10 mg Tab immediate release tablet Take 1 tablet (10 mg total) by mouth every 4 (four) hours as needed for Pain. 18 tablet 0    predniSONE (DELTASONE) 10 MG tablet Take 1 tablet (10 mg total) by mouth once daily. Take 4 tabs x 3 days, then take 2 tabs x 3 days, then take 1 tab x 3 days. 21 tablet 0    sumatriptan (IMITREX) 25 MG Tab Take 50 mg by mouth every 2 (two) hours as needed.      tiZANidine (ZANAFLEX) 4 MG tablet Take 1 tablet (4 mg total) by mouth 2 (two) times a day. 60 tablet 1     No current facility-administered medications for this visit.   [2]   Social History  Socioeconomic History    Marital status:    Tobacco Use    Smoking status: Never    Smokeless tobacco: Current     Types: Chew    Tobacco comments:     Can a week   Substance and Sexual Activity     Alcohol use: Not Currently    Drug use: Never    Sexual activity: Yes     Partners: Female     Social Drivers of Health     Financial Resource Strain: Low Risk  (4/2/2025)    Overall Financial Resource Strain (CARDIA)     Difficulty of Paying Living Expenses: Not very hard   Food Insecurity: No Food Insecurity (4/2/2025)    Hunger Vital Sign     Worried About Running Out of Food in the Last Year: Never true     Ran Out of Food in the Last Year: Never true   Transportation Needs: No Transportation Needs (4/2/2025)    PRAPARE - Transportation     Lack of Transportation (Medical): No     Lack of Transportation (Non-Medical): No   Physical Activity: Sufficiently Active (4/2/2025)    Exercise Vital Sign     Days of Exercise per Week: 6 days     Minutes of Exercise per Session: 60 min   Stress: Stress Concern Present (4/2/2025)    [employer] [place] [employer] - Occupational Stress Questionnaire     Feeling of Stress : Very much   Housing Stability: Low Risk  (4/2/2025)    Housing Stability Vital Sign     Unable to Pay for Housing in the Last Year: No     Number of Times Moved in the Last Year: 0     Homeless in the Last Year: No

## 2025-05-01 ENCOUNTER — PATIENT MESSAGE (OUTPATIENT)
Dept: ORTHOPEDICS | Facility: CLINIC | Age: 35
End: 2025-05-01

## 2025-05-01 ENCOUNTER — OFFICE VISIT (OUTPATIENT)
Dept: ORTHOPEDICS | Facility: CLINIC | Age: 35
End: 2025-05-01
Payer: OTHER GOVERNMENT

## 2025-05-01 VITALS — WEIGHT: 216.69 LBS | HEIGHT: 73 IN | BODY MASS INDEX: 28.72 KG/M2

## 2025-05-01 DIAGNOSIS — M54.16 LUMBAR RADICULOPATHY: Primary | ICD-10-CM

## 2025-05-01 PROCEDURE — 99499 UNLISTED E&M SERVICE: CPT | Mod: S$PBB,,, | Performed by: ORTHOPAEDIC SURGERY

## 2025-05-01 PROCEDURE — 99999 PR PBB SHADOW E&M-EST. PATIENT-LVL III: CPT | Mod: PBBFAC,,, | Performed by: ORTHOPAEDIC SURGERY

## 2025-05-01 PROCEDURE — 99213 OFFICE O/P EST LOW 20 MIN: CPT | Mod: PBBFAC | Performed by: ORTHOPAEDIC SURGERY

## 2025-05-05 ENCOUNTER — OFFICE VISIT (OUTPATIENT)
Dept: NEUROLOGY | Facility: CLINIC | Age: 35
End: 2025-05-05
Payer: OTHER GOVERNMENT

## 2025-05-05 DIAGNOSIS — M51.362 DEGENERATION OF INTERVERTEBRAL DISC OF LUMBAR REGION WITH DISCOGENIC BACK PAIN AND LOWER EXTREMITY PAIN: ICD-10-CM

## 2025-05-05 DIAGNOSIS — M51.360 DEGENERATION OF INTERVERTEBRAL DISC OF LUMBAR REGION WITH DISCOGENIC BACK PAIN: ICD-10-CM

## 2025-05-05 DIAGNOSIS — M54.16 LUMBAR RADICULOPATHY: ICD-10-CM

## 2025-05-05 DIAGNOSIS — M62.830 BACK SPASM: Primary | ICD-10-CM

## 2025-05-05 PROCEDURE — 98001 SYNCH AUDIO-VIDEO NEW LOW 30: CPT | Mod: 95,,, | Performed by: PSYCHIATRY & NEUROLOGY

## 2025-05-05 RX ORDER — CYCLOBENZAPRINE HCL 10 MG
10 TABLET ORAL 3 TIMES DAILY PRN
Qty: 30 TABLET | Refills: 3 | Status: SHIPPED | OUTPATIENT
Start: 2025-05-05

## 2025-05-05 NOTE — PROGRESS NOTES
Neurology Telemedicine Note     Name: Alex Washburn  MRN: [id number]   CSN: [id number]      Date: 05/05/2025    The patient location is: Home  The chief complaint leading to consultation is: back spasms  Visit type: Virtual visit with synchronous audio and video    TOTAL TIME SPENT: 25 mins    Each patient to whom I provide medical services by telemedicine is:  (1) informed of the relationship between the physician and patient and the respective role of any other health care provider with respect to management of the patient; and (2) notified that they may decline to receive medical services by telemedicine and may withdraw from such care at any time.    History of Present Illness (HPI): Patient is a 35 yr old male with history of L5/S1 microdiscectomy in 2023 who presents to tele-neurology clinic due to lower back muscle spasms.  Patient was recently started on Lyrica, Celebrex and also tizanidine.  Patient states that tizanidine has not helped or his muscle spasms.  Patient states that during the day his back will lock up.  He has not noticed any difference since starting tizanidine.  Patient denies any motor weakness in his lower extremities.  Patient has not had any physical therapy.  Patient was sent by his pain management doctor to Neurology for further treatment.  I will recommend physical therapy and replacing tizanidine with cyclobenzaprine.  Consider EMG nerve conduction study in the future.  No new alarm symptoms noted.  Patient denies any leg weakness at this time.    Nonmotor/Premotor ROS: as per HPI, and all other systems are negative    Past Medical History: The patient  has no past medical history on file.    Social History: The patient  reports that he has never smoked. His smokeless tobacco use includes chew. He reports that he does not currently use alcohol. He reports that he does not use drugs.    Family History: Their family history is not on file.    Allergies: Patient has no known allergies.      Meds: Medications Ordered Prior to Encounter[1]    Exam:  Vital Signs deferred with home visit    Constitutional  Well-developed, well-nourished, appears stated age   Eyes  No scleral icterus   ENT  Moist oral mucosa   Cardiovascular  No lower extremity edema    Respiratory  No labored breathing    Skin  No rashes   Hematologic  No bruising   Psychiatric  Normal mood and affect   Other  GI/ deferred    Neurological     * Mental status  Alert and oriented to person, place, time, and situation; no dysarthria; no aphasia; normal recent and remote memory; follows commands   * Cranial nerves     - CN II  Pupils midposition and symmetric   - CN III, IV, VI  Extraocular movements full, no nystagmus visualized   - CN V  Unable to test   - CN VII  Face strong and symmetric bilaterally    - CN VIII  Hearing grossly intact with conversation    - CN IX, X  Palate raises midline and symmetric    - CN XI  Strong shoulder shrug B    - CN XII  Tongue appears midline    * Motor  Normal bulk by appearance, no drift    * Sensory  Not tested objectively, no changes described by the patient   * Deep tendon reflexes  Not tested   * Coord/Movemt/Gait No hypophonic speech.  No facial masking.  No B bradykinesia.  No tremor with rest, posture, kinesis, or intention.   No chorea, athetosis, dystonia, myoclonus, or tics.   No motor impersistence.  Gait is deferred for safety.       Medical Record Review:  - Lab Results:  No visits with results within 3 Month(s) from this visit.   Latest known visit with results is:   Admission on 10/08/2024, Discharged on 10/08/2024   Component Date Value Ref Range Status    Rapid Strep A Screen 10/08/2024 Negative  Negative Final     Acceptable 10/08/2024 Yes   Final    POC Rapid COVID 10/08/2024 Negative  Negative Final     Acceptable 10/08/2024 Yes   Final    POC Molecular Influenza A Ag 10/08/2024 Negative  Negative Final    POC Molecular Influenza B Ag 10/08/2024  Negative  Negative Final     Acceptable 10/08/2024 Yes   Final       Diagnoses:   Patient is a 35 yr old male with history of L5/S1 microdiscectomy in  who presents to tele-neurology clinic due to lower back muscle spasms.  Patient was recently started on Lyrica, Celebrex and also tizanidine.  Patient states that tizanidine has not helped or his muscle spasms.  Patient states that during the day his back will lock up.  He has not noticed any difference since starting tizanidine.  Patient denies any motor weakness in his lower extremities.  Patient has not had any physical therapy.  Patient was sent by his pain management doctor to Neurology for further treatment.  I will recommend physical therapy and replacing tizanidine with cyclobenzaprine.  Consider EMG nerve conduction study in the future.  No new alarm symptoms noted.  Patient denies any leg weakness at this time.    Medical Decision Makin) stop tizanidine  2) start Flexeril 10 mg 3 times a day as needed for back spasms  3) physical therapy for lumbar radiculopathy and back spasms    F/u prn      I spent 25 minutes with the patient with >50% of the time spent with counseling and education regarding:    This is a consult performed through audio-visual using [employer] rosemary. Pt and provider are in different locations. History and physical exam are limited due to the nature of this encounter.       Marco Trevino MD             [1]   Current Outpatient Medications on File Prior to Visit   Medication Sig Dispense Refill    celecoxib (CELEBREX) 200 MG capsule Take 1 capsule (200 mg total) by mouth 2 (two) times daily. 60 capsule 0    pregabalin (LYRICA) 150 MG capsule Take 1 capsule (150 mg total) by mouth 2 (two) times daily. 60 capsule 6    sumatriptan (IMITREX) 25 MG Tab Take 50 mg by mouth every 2 (two) hours as needed.      [DISCONTINUED] tiZANidine (ZANAFLEX) 4 MG tablet Take 1 tablet (4 mg total) by mouth 2 (two) times a day.  60 tablet 1     No current facility-administered medications on file prior to visit.

## 2025-05-13 ENCOUNTER — OFFICE VISIT (OUTPATIENT)
Dept: PAIN MEDICINE | Facility: CLINIC | Age: 35
End: 2025-05-13
Attending: ANESTHESIOLOGY
Payer: OTHER GOVERNMENT

## 2025-05-13 ENCOUNTER — PATIENT MESSAGE (OUTPATIENT)
Dept: PAIN MEDICINE | Facility: CLINIC | Age: 35
End: 2025-05-13

## 2025-05-13 VITALS
DIASTOLIC BLOOD PRESSURE: 88 MMHG | TEMPERATURE: 98 F | BODY MASS INDEX: 28.59 KG/M2 | RESPIRATION RATE: 15 BRPM | HEART RATE: 103 BPM | OXYGEN SATURATION: 100 % | WEIGHT: 216.69 LBS | SYSTOLIC BLOOD PRESSURE: 133 MMHG

## 2025-05-13 DIAGNOSIS — M51.362 DEGENERATION OF INTERVERTEBRAL DISC OF LUMBAR REGION WITH DISCOGENIC BACK PAIN AND LOWER EXTREMITY PAIN: Primary | ICD-10-CM

## 2025-05-13 DIAGNOSIS — M51.360 DEGENERATION OF INTERVERTEBRAL DISC OF LUMBAR REGION WITH DISCOGENIC BACK PAIN: Primary | ICD-10-CM

## 2025-05-13 DIAGNOSIS — M54.16 LUMBAR RADICULOPATHY: ICD-10-CM

## 2025-05-13 PROCEDURE — 99999 PR PBB SHADOW E&M-EST. PATIENT-LVL III: CPT | Mod: PBBFAC,,, | Performed by: ANESTHESIOLOGY

## 2025-05-13 PROCEDURE — 99213 OFFICE O/P EST LOW 20 MIN: CPT | Mod: PBBFAC | Performed by: ANESTHESIOLOGY

## 2025-05-13 PROCEDURE — 99214 OFFICE O/P EST MOD 30 MIN: CPT | Mod: S$PBB,,, | Performed by: ANESTHESIOLOGY

## 2025-05-13 NOTE — PROGRESS NOTES
Subjective:      Patient ID: Alex Washburn is a 35 y.o. male.    Chief Complaint: Low-back Pain (Pain radiates into both legs )    Referred by: No ref. provider found     Interval History 5/13/2025:  Patient presents to clinic for lower back pain. He was seen by spine surgery who did not recommend a surgery at this time. He was seen by neurology who did not find further alarm symptoms and sent the patient for physical therapy, they stopped his tizanidine and started Flexeril. He is also taking Lyrica and Celebrex, which provide moderate relief. The flexeril makes him very drowsy even the next morning. His pain continues to be in the middle lower back, it can radiate down the anterior left leg to the top of the foot and down the anterior right leg, stopping at the knee. Exacerbated by sitting, standing, walking and laying flat. Mitigated by medications or changing positions. Rates the pain as 4/10, can become 8/10. Patient has no new episodes of urinary or bowel incontinence. He denies other red flag symptoms.     Initial HPI (4/23/2025): Alex Washburn Is presenting to the clinic complaining of lower back pain.  Patient describes the pain as achy, sharp, dull and sore with radiation down the left lower extremity all the way to his foot and down the right lower extremity to his knees.  Patient said the pain is worse with prolonged standing, prolonged walking, sitting and laying flat in bed.  Patient said he has had a diskectomy at L5/S1 in the past which helped with some of his symptoms.  He also had limited relief with epidural injection at an outside pain clinic.  Patient trailed gabapentin and pregabalin and short-term courses of opioids ( Percocets and oxycodone) and reports limited relief.   Patient endorses multiple ED visits for 4 through pain and said he has several episodes of urinary and bowel incontinence.  He presented to the neurosurgery clinic for evaluation and no surgical intervention was  recommended and was subsequently referred to the pain Clinic.  Denied any fevers, weight loss.  No recent trauma or falls.  No loss of vision, saddle anesthesia, difficulties with balance or ambulation.    Pain Interventions:  MAIK at L4/5 and L5/S1 at outside facility.     Relevant Surgeries:  L5/S1 Hemidisectomy     Conservative Treatment  - Physical Therpy: No   - Home Exercise Program: Yes  - Chiropractic: No     Medications  Gabapentin - no relief   Pregabalin 100 mg BID  Percocet - Outside provider   Oxycodone - Outside provider      Blood thinners: None     Relevant Imaging  MRI LUMBAR SPINE W WO CONTRAST     FINDINGS:  See study report 1 week prior also.     Alignment: Normal.     Vertebrae: Normal marrow signal. No fracture.     Discs: Severe disc space narrowing and desiccation at L5-S1.  The discs are adequately maintained elsewhere.     Cord: Normal.  Conus terminates at L1     Degenerative findings:     T12-L1: No significant abnormality.     L1-L2: No significant abnormality.     L2-L3: No significant abnormality.     L3-L4: No significant abnormality.     L4-L5: No significant abnormality.     L5-S1: Mild diffuse posterior disc osteophyte complex with minimal central/left paracentral disc protrusion there is adjacent scar tissue.  This could potentially affect the left S1 nerve root.  Recommend clinical correlation.  No significant central canal or foraminal narrowing.  Left L5-S1 hemilaminectomy     Paraspinal muscles & soft tissues: Unremarkable.     Impression:     1. No acute abnormality.  2. Degenerative changes at L5-S1 with severe disc space narrowing and small central/left paracentral disc protrusion.  This could potentially affect the left S1 nerve root.  There are postoperative changes with enhancing scar tissue on the left also.  Recommend clinical correlation.       XR LUMBAR SPINE AP AND LATERAL    FINDINGS:  Alignment: Alignment is maintained.     Vertebrae: Vertebral body heights are  maintained.  No suspicious appearing lytic or blastic lesions.     Discs and facets: L5-S1 disc space narrowing and spondylitic spurring.  Posterior elements intact.     Impression:     L5-S1 spondylosis    History reviewed. No pertinent past medical history.    History reviewed. No pertinent surgical history.    Review of patient's allergies indicates:  No Known Allergies    Current Medications[1]    No family history on file.    Social History[2]      Review of Systems   Constitutional: Negative.   HENT: Negative.     Eyes: Negative.    Cardiovascular: Negative.    Respiratory: Negative.     Endocrine: Negative.    Skin: Negative.    Musculoskeletal:  Positive for back pain.   Gastrointestinal: Negative.    Genitourinary: Negative.    Neurological: Negative.    Psychiatric/Behavioral: Negative.         Objective:   /88 (BP Location: Left arm, Patient Position: Sitting)   Pulse 103   Temp 98.2 °F (36.8 °C) (Oral)   Resp 15   Wt 98.3 kg (216 lb 11.4 oz)   SpO2 100%   BMI 28.59 kg/m²   Pain Disability Index Review:      5/13/2025     9:23 AM   Last 3 PDI Scores   Pain Disability Index (PDI) 40     PHYSICAL EXAM   Vitals:    05/13/25 0920   BP: 133/88   Pulse: 103   Resp: 15   Temp: 98.2 °F (36.8 °C)     GENERAL: Alert and Oriented x3  H&N: NC/AT  CV/PULM: Regular rate. Normal WOB. Symmetric chest rise. No peripheral edema.  SKIN: Intact. No visible rashes or lesions.     MUSCULOSKELETAL/NEUROLGIC:  INSPECTION: Limbs are grossly symmetric. Normal muscle bulk for age. No obvious deformities.  SENSORY: Intact and symmetric to light touch distally in BUE and BLE  MOTOR: Upper Extremity Manual Muscle Testing (*= weakness due to pain)   Right Left   Elbow Flexion 5/5 5/5   Elbow Extension 5/5 5/5   Wrist Extension 5/5 5/5   Finger Flexion () 5/5 5/5   Finger Abduction 5/5 5/5   Shoulder Abduction 5/5 5/5   Shoulder Flexion 5/5 5/5     Lower Extremity Manual Muscle Testing   Right Left   Hip Flexion 5/5 5/5  "  Knee Extension 5/5 5/5   Ankle Dorsiflexion 5/5 5/5   Ankle Plantarflexion 5/5 5/5   Great Toe Extension (EHL) 5/5 5/5   Knee Flexion 5/5 5/5     DTR:    Right Left   Biceps 2 2   Brachioradialis 2 2   Triceps 2 2   Patellar 2 2   Achilles 2 2     Upper Track Signs:    Right Left   Li's  Negative Negative   Babinski Response Down Going  Down Going    Ankle Clonus No Clonus No Clonus      CERVICAL SPINE:   INSPECTION: No obvious abnormalities.  PALPATION: Non tender to palpation.   ROM: No limited ROM  Spurling's: Negative  Facet Loading: Negative  Llhermitte's: negative Negative    LUMBAR SPINE:  INSPECTION: No gross atrophy or deformity   PALPATION: Tender to palpation over lumbar facet joint.   ROM: Limited with pain, worse with extension   SLR: Negative  Femoral Stretch: Positive   Facet loading: Positive   FADIR: Negative   TESFAYE: Negative    Sacral Distraction: Negative  SI Thigh Thrust: Negative   Sacral compression: Negative   Sacral Thrust: Negative  Gaenslen's: Negative   Yeomen's: Positive   Milgram: Positive     (Knee, Shoulder, Ankle, Elbow, Hand)  INSPECTION: Normal  PALPATION: Non tender  ROM: wnl     Ortho/SPM Exam      Assessment:       Encounter Diagnoses   Name Primary?    Degeneration of intervertebral disc of lumbar region with discogenic back pain and lower extremity pain Yes    Lumbar radiculopathy            Plan:   We discussed with the patient the assessment and recommendations. The following is the plan we agreed on:        Alex Anderson" was seen today for low-back pain.    Diagnoses and all orders for this visit:    Degeneration of intervertebral disc of lumbar region with discogenic back pain and lower extremity pain    Lumbar radiculopathy         Images reviewed and discussed with patient.   Patient seen by both Spine Surgery and Neurology both which cleared patient for procedure. Did not demonstrate significant concern over his prior episodes of incontinence. Patient no longer " having incontinence episodes.    Schedule patient for bilateral L5/S1 TFESI. Future consideration for ViaDisc vs Intracept.   Follow up two weeks after procedure with MILTON     Harrison Ch M.D.  PGY-5  Interventional Pain Management Fellow  [facility]  Pager: [phone]    I have personally taken the history and examined this patient and agree with the fellow's note as stated above.          [1]   Current Outpatient Medications   Medication Sig Dispense Refill    celecoxib (CELEBREX) 200 MG capsule Take 1 capsule (200 mg total) by mouth 2 (two) times daily. 60 capsule 0    cyclobenzaprine (FLEXERIL) 10 MG tablet Take 1 tablet (10 mg total) by mouth 3 (three) times daily as needed for Muscle spasms (back spasms). 30 tablet 3    pregabalin (LYRICA) 150 MG capsule Take 1 capsule (150 mg total) by mouth 2 (two) times daily. 60 capsule 6    sumatriptan (IMITREX) 25 MG Tab Take 50 mg by mouth every 2 (two) hours as needed.       No current facility-administered medications for this visit.   [2]   Social History  Socioeconomic History    Marital status:    Tobacco Use    Smoking status: Never    Smokeless tobacco: Current     Types: Chew    Tobacco comments:     Can a week   Substance and Sexual Activity    Alcohol use: Not Currently    Drug use: Never    Sexual activity: Yes     Partners: Female     Social Drivers of Health     Financial Resource Strain: Low Risk  (4/2/2025)    Overall Financial Resource Strain (CARDIA)     Difficulty of Paying Living Expenses: Not very hard   Food Insecurity: No Food Insecurity (4/2/2025)    Hunger Vital Sign     Worried About Running Out of Food in the Last Year: Never true     Ran Out of Food in the Last Year: Never true   Transportation Needs: No Transportation Needs (4/2/2025)    PRAPARE - Transportation     Lack of Transportation (Medical): No     Lack of Transportation (Non-Medical): No   Physical Activity: Sufficiently Active (4/2/2025)    Exercise Vital  Sign     Days of Exercise per Week: 6 days     Minutes of Exercise per Session: 60 min   Stress: Stress Concern Present (4/2/2025)    [place] [employer] [place] [employer] - Occupational Stress Questionnaire     Feeling of Stress : Very much   Housing Stability: Low Risk  (4/2/2025)    Housing Stability Vital Sign     Unable to Pay for Housing in the Last Year: No     Number of Times Moved in the Last Year: 0     Homeless in the Last Year: No

## 2025-05-13 NOTE — H&P (VIEW-ONLY)
Subjective:      Patient ID: Alex Washburn is a 35 y.o. male.    Chief Complaint: Low-back Pain (Pain radiates into both legs )    Referred by: No ref. provider found     Interval History 5/13/2025:  Patient presents to clinic for lower back pain. He was seen by spine surgery who did not recommend a surgery at this time. He was seen by neurology who did not find further alarm symptoms and sent the patient for physical therapy, they stopped his tizanidine and started Flexeril. He is also taking Lyrica and Celebrex, which provide moderate relief. The flexeril makes him very drowsy even the next morning. His pain continues to be in the middle lower back, it can radiate down the anterior left leg to the top of the foot and down the anterior right leg, stopping at the knee. Exacerbated by sitting, standing, walking and laying flat. Mitigated by medications or changing positions. Rates the pain as 4/10, can become 8/10. Patient has no new episodes of urinary or bowel incontinence. He denies other red flag symptoms.     Initial HPI (4/23/2025): Alex Washburn Is presenting to the clinic complaining of lower back pain.  Patient describes the pain as achy, sharp, dull and sore with radiation down the left lower extremity all the way to his foot and down the right lower extremity to his knees.  Patient said the pain is worse with prolonged standing, prolonged walking, sitting and laying flat in bed.  Patient said he has had a diskectomy at L5/S1 in the past which helped with some of his symptoms.  He also had limited relief with epidural injection at an outside pain clinic.  Patient trailed gabapentin and pregabalin and short-term courses of opioids ( Percocets and oxycodone) and reports limited relief.   Patient endorses multiple ED visits for 4 through pain and said he has several episodes of urinary and bowel incontinence.  He presented to the neurosurgery clinic for evaluation and no surgical intervention was  recommended and was subsequently referred to the pain Clinic.  Denied any fevers, weight loss.  No recent trauma or falls.  No loss of vision, saddle anesthesia, difficulties with balance or ambulation.    Pain Interventions:  MAIK at L4/5 and L5/S1 at outside facility.     Relevant Surgeries:  L5/S1 Hemidisectomy     Conservative Treatment  - Physical Therpy: No   - Home Exercise Program: Yes  - Chiropractic: No     Medications  Gabapentin - no relief   Pregabalin 100 mg BID  Percocet - Outside provider   Oxycodone - Outside provider      Blood thinners: None     Relevant Imaging  MRI LUMBAR SPINE W WO CONTRAST     FINDINGS:  See study report 1 week prior also.     Alignment: Normal.     Vertebrae: Normal marrow signal. No fracture.     Discs: Severe disc space narrowing and desiccation at L5-S1.  The discs are adequately maintained elsewhere.     Cord: Normal.  Conus terminates at L1     Degenerative findings:     T12-L1: No significant abnormality.     L1-L2: No significant abnormality.     L2-L3: No significant abnormality.     L3-L4: No significant abnormality.     L4-L5: No significant abnormality.     L5-S1: Mild diffuse posterior disc osteophyte complex with minimal central/left paracentral disc protrusion there is adjacent scar tissue.  This could potentially affect the left S1 nerve root.  Recommend clinical correlation.  No significant central canal or foraminal narrowing.  Left L5-S1 hemilaminectomy     Paraspinal muscles & soft tissues: Unremarkable.     Impression:     1. No acute abnormality.  2. Degenerative changes at L5-S1 with severe disc space narrowing and small central/left paracentral disc protrusion.  This could potentially affect the left S1 nerve root.  There are postoperative changes with enhancing scar tissue on the left also.  Recommend clinical correlation.       XR LUMBAR SPINE AP AND LATERAL    FINDINGS:  Alignment: Alignment is maintained.     Vertebrae: Vertebral body heights are  maintained.  No suspicious appearing lytic or blastic lesions.     Discs and facets: L5-S1 disc space narrowing and spondylitic spurring.  Posterior elements intact.     Impression:     L5-S1 spondylosis    History reviewed. No pertinent past medical history.    History reviewed. No pertinent surgical history.    Review of patient's allergies indicates:  No Known Allergies    Current Medications[1]    No family history on file.    Social History[2]      Review of Systems   Constitutional: Negative.   HENT: Negative.     Eyes: Negative.    Cardiovascular: Negative.    Respiratory: Negative.     Endocrine: Negative.    Skin: Negative.    Musculoskeletal:  Positive for back pain.   Gastrointestinal: Negative.    Genitourinary: Negative.    Neurological: Negative.    Psychiatric/Behavioral: Negative.         Objective:   /88 (BP Location: Left arm, Patient Position: Sitting)   Pulse 103   Temp 98.2 °F (36.8 °C) (Oral)   Resp 15   Wt 98.3 kg (216 lb 11.4 oz)   SpO2 100%   BMI 28.59 kg/m²   Pain Disability Index Review:      5/13/2025     9:23 AM   Last 3 PDI Scores   Pain Disability Index (PDI) 40     PHYSICAL EXAM   Vitals:    05/13/25 0920   BP: 133/88   Pulse: 103   Resp: 15   Temp: 98.2 °F (36.8 °C)     GENERAL: Alert and Oriented x3  H&N: NC/AT  CV/PULM: Regular rate. Normal WOB. Symmetric chest rise. No peripheral edema.  SKIN: Intact. No visible rashes or lesions.     MUSCULOSKELETAL/NEUROLGIC:  INSPECTION: Limbs are grossly symmetric. Normal muscle bulk for age. No obvious deformities.  SENSORY: Intact and symmetric to light touch distally in BUE and BLE  MOTOR: Upper Extremity Manual Muscle Testing (*= weakness due to pain)   Right Left   Elbow Flexion 5/5 5/5   Elbow Extension 5/5 5/5   Wrist Extension 5/5 5/5   Finger Flexion () 5/5 5/5   Finger Abduction 5/5 5/5   Shoulder Abduction 5/5 5/5   Shoulder Flexion 5/5 5/5     Lower Extremity Manual Muscle Testing   Right Left   Hip Flexion 5/5 5/5  "  Knee Extension 5/5 5/5   Ankle Dorsiflexion 5/5 5/5   Ankle Plantarflexion 5/5 5/5   Great Toe Extension (EHL) 5/5 5/5   Knee Flexion 5/5 5/5     DTR:    Right Left   Biceps 2 2   Brachioradialis 2 2   Triceps 2 2   Patellar 2 2   Achilles 2 2     Upper Track Signs:    Right Left   Li's  Negative Negative   Babinski Response Down Going  Down Going    Ankle Clonus No Clonus No Clonus      CERVICAL SPINE:   INSPECTION: No obvious abnormalities.  PALPATION: Non tender to palpation.   ROM: No limited ROM  Spurling's: Negative  Facet Loading: Negative  Llhermitte's: negative Negative    LUMBAR SPINE:  INSPECTION: No gross atrophy or deformity   PALPATION: Tender to palpation over lumbar facet joint.   ROM: Limited with pain, worse with extension   SLR: Negative  Femoral Stretch: Positive   Facet loading: Positive   FADIR: Negative   TESFAYE: Negative    Sacral Distraction: Negative  SI Thigh Thrust: Negative   Sacral compression: Negative   Sacral Thrust: Negative  Gaenslen's: Negative   Yeomen's: Positive   Milgram: Positive     (Knee, Shoulder, Ankle, Elbow, Hand)  INSPECTION: Normal  PALPATION: Non tender  ROM: wnl     Ortho/SPM Exam      Assessment:       Encounter Diagnoses   Name Primary?    Degeneration of intervertebral disc of lumbar region with discogenic back pain and lower extremity pain Yes    Lumbar radiculopathy            Plan:   We discussed with the patient the assessment and recommendations. The following is the plan we agreed on:        Alex Anderson" was seen today for low-back pain.    Diagnoses and all orders for this visit:    Degeneration of intervertebral disc of lumbar region with discogenic back pain and lower extremity pain    Lumbar radiculopathy         Images reviewed and discussed with patient.   Patient seen by both Spine Surgery and Neurology both which cleared patient for procedure. Did not demonstrate significant concern over his prior episodes of incontinence. Patient no longer " having incontinence episodes.    Schedule patient for bilateral L5/S1 TFESI. Future consideration for ViaDisc vs Intracept.   Follow up two weeks after procedure with MILTON     Harrison Ch M.D.  PGY-5  Interventional Pain Management Fellow  [facility]  Pager: [phone]    I have personally taken the history and examined this patient and agree with the fellow's note as stated above.          [1]   Current Outpatient Medications   Medication Sig Dispense Refill    celecoxib (CELEBREX) 200 MG capsule Take 1 capsule (200 mg total) by mouth 2 (two) times daily. 60 capsule 0    cyclobenzaprine (FLEXERIL) 10 MG tablet Take 1 tablet (10 mg total) by mouth 3 (three) times daily as needed for Muscle spasms (back spasms). 30 tablet 3    pregabalin (LYRICA) 150 MG capsule Take 1 capsule (150 mg total) by mouth 2 (two) times daily. 60 capsule 6    sumatriptan (IMITREX) 25 MG Tab Take 50 mg by mouth every 2 (two) hours as needed.       No current facility-administered medications for this visit.   [2]   Social History  Socioeconomic History    Marital status:    Tobacco Use    Smoking status: Never    Smokeless tobacco: Current     Types: Chew    Tobacco comments:     Can a week   Substance and Sexual Activity    Alcohol use: Not Currently    Drug use: Never    Sexual activity: Yes     Partners: Female     Social Drivers of Health     Financial Resource Strain: Low Risk  (4/2/2025)    Overall Financial Resource Strain (CARDIA)     Difficulty of Paying Living Expenses: Not very hard   Food Insecurity: No Food Insecurity (4/2/2025)    Hunger Vital Sign     Worried About Running Out of Food in the Last Year: Never true     Ran Out of Food in the Last Year: Never true   Transportation Needs: No Transportation Needs (4/2/2025)    PRAPARE - Transportation     Lack of Transportation (Medical): No     Lack of Transportation (Non-Medical): No   Physical Activity: Sufficiently Active (4/2/2025)    Exercise Vital  Sign     Days of Exercise per Week: 6 days     Minutes of Exercise per Session: 60 min   Stress: Stress Concern Present (4/2/2025)    [employer] - Occupational Stress Questionnaire     Feeling of Stress : Very much   Housing Stability: Low Risk  (4/2/2025)    Housing Stability Vital Sign     Unable to Pay for Housing in the Last Year: No     Number of Times Moved in the Last Year: 0     Homeless in the Last Year: No

## 2025-05-26 ENCOUNTER — HOSPITAL ENCOUNTER (OUTPATIENT)
Facility: OTHER | Age: 35
Discharge: HOME | End: 2025-05-26
Attending: ANESTHESIOLOGY | Admitting: ANESTHESIOLOGY
Payer: OTHER GOVERNMENT

## 2025-05-26 VITALS
TEMPERATURE: 99 F | BODY MASS INDEX: 28.49 KG/M2 | WEIGHT: 215 LBS | SYSTOLIC BLOOD PRESSURE: 142 MMHG | RESPIRATION RATE: 18 BRPM | OXYGEN SATURATION: 97 % | HEIGHT: 73 IN | HEART RATE: 87 BPM | DIASTOLIC BLOOD PRESSURE: 94 MMHG

## 2025-05-26 DIAGNOSIS — G89.29 CHRONIC PAIN: ICD-10-CM

## 2025-05-26 DIAGNOSIS — M54.16 LUMBAR RADICULOPATHY: Primary | ICD-10-CM

## 2025-05-26 PROCEDURE — 25500020 PHARM REV CODE 255: Performed by: ANESTHESIOLOGY

## 2025-05-26 PROCEDURE — 64483 NJX AA&/STRD TFRM EPI L/S 1: CPT | Mod: 50,,, | Performed by: ANESTHESIOLOGY

## 2025-05-26 PROCEDURE — 64483 NJX AA&/STRD TFRM EPI L/S 1: CPT | Mod: 50 | Performed by: ANESTHESIOLOGY

## 2025-05-26 PROCEDURE — 63600175 PHARM REV CODE 636 W HCPCS: Performed by: ANESTHESIOLOGY

## 2025-05-26 RX ORDER — LIDOCAINE HYDROCHLORIDE 20 MG/ML
INJECTION, SOLUTION INFILTRATION; PERINEURAL
Status: DISCONTINUED | OUTPATIENT
Start: 2025-05-26 | End: 2025-05-26 | Stop reason: HOSPADM

## 2025-05-26 RX ORDER — DEXAMETHASONE SODIUM PHOSPHATE 10 MG/ML
INJECTION, SOLUTION INTRA-ARTICULAR; INTRALESIONAL; INTRAMUSCULAR; INTRAVENOUS; SOFT TISSUE
Status: DISCONTINUED | OUTPATIENT
Start: 2025-05-26 | End: 2025-05-26 | Stop reason: HOSPADM

## 2025-05-26 RX ORDER — SODIUM CHLORIDE 9 MG/ML
INJECTION, SOLUTION INTRAVENOUS CONTINUOUS
Status: DISCONTINUED | OUTPATIENT
Start: 2025-05-26 | End: 2025-05-26 | Stop reason: HOSPADM

## 2025-05-26 RX ORDER — LIDOCAINE HYDROCHLORIDE 10 MG/ML
INJECTION, SOLUTION EPIDURAL; INFILTRATION; INTRACAUDAL; PERINEURAL
Status: DISCONTINUED | OUTPATIENT
Start: 2025-05-26 | End: 2025-05-26 | Stop reason: HOSPADM

## 2025-05-26 NOTE — OP NOTE
Lumbar Transforaminal Epidural Steroid Injection under Fluoroscopic Guidance    The procedure, risks, benefits, and options were discussed with the patient. There are no contraindications to the procedure. The patent expressed understanding and agreed to the procedure. Informed written consent was obtained prior to the start of the procedure and can be found in the patient's chart.    PATIENT NAME: Alex Washburn   MRN: [id number]     DATE OF PROCEDURE: 05/26/2025    PROCEDURE:  Bilateral  L5/S1 Lumbar Transforaminal Epidural Steroid Injection under Fluoroscopic Guidance    PRE-OP DIAGNOSIS: Degeneration of intervertebral disc of lumbar region with discogenic back pain [M51.360] Lumbar radiculopathy [M54.16]    POST-OP DIAGNOSIS: Same    PHYSICIAN: Scott Nick MD    ASSISTANTS: Harrison Ch MD  [facility]      MEDICATIONS INJECTED: Preservative-free Decadron 10mg with 5cc of Lidocaine 1% MPF     LOCAL ANESTHETIC INJECTED: Xylocaine 2%     SEDATION: None    ESTIMATED BLOOD LOSS: None    COMPLICATIONS: None    TECHNIQUE: Time-out was performed to identify the patient and procedure to be performed. With the patient laying in a prone position, the surgical area was prepped and draped in the usual sterile fashion using ChloraPrep and a fenestrated drape.The levels were determined under fluoroscopy guidance. Skin anesthesia was achieved by injecting Lidocaine 2% over the injection sites. The transforaminal spaces were then approached with a 22 gauge, 3.5 inch spinal quinke needle that was introduced under fluoroscopic guidance in the AP and Lateral views. Once the needle tip was in the area of the transforaminal space, and there was no blood, CSF or paraesthesias, contrast dye Omnipaque (300mg/mL) was injected to confirm placement and there was no vascular runoff. Fluoroscopic imaging in the AP and lateral views revealed a clear outline of the spinal nerve with proximal spread of agent through the neural  foramen into the epidural space. 3 mL of the medication mixture listed above was injected slowly at each site. Displacement of the radio opaque contrast after injection of the medication confirmed that the medication went into the area of the transforaminal spaces. The needles were removed and bleeding was nil. A sterile dressing was applied. No specimens collected. The patient tolerated the procedure well.     PRE-PROCEDURE PAIN SCORE: 5-8/10    POST-PROCEDURE PAIN SCORE: 0/10    The patient was monitored after the procedure in the recovery area. They were given post-procedure and discharge instructions to follow at home. The patient was discharged in a stable condition.    Scott Nick MD

## 2025-05-26 NOTE — DISCHARGE SUMMARY
Discharge Note  Short Stay      SUMMARY     Admit Date: 5/26/2025    Attending Physician: Scott Nick      Discharge Physician: Scott Nick      Discharge Date: 5/26/2025 9:45 AM    Procedure(s) (LRB):  LUMBAR TRANSFORAMINAL BILATERAL L5/S1 (Bilateral)    Final Diagnosis: Degeneration of intervertebral disc of lumbar region with discogenic back pain [M51.360]    Disposition: Home or self care    Patient Instructions:   Current Discharge Medication List        CONTINUE these medications which have NOT CHANGED    Details   celecoxib (CELEBREX) 200 MG capsule Take 1 capsule (200 mg total) by mouth 2 (two) times daily.  Qty: 60 capsule, Refills: 0      cyclobenzaprine (FLEXERIL) 10 MG tablet Take 1 tablet (10 mg total) by mouth 3 (three) times daily as needed for Muscle spasms (back spasms).  Qty: 30 tablet, Refills: 3      pregabalin (LYRICA) 150 MG capsule Take 1 capsule (150 mg total) by mouth 2 (two) times daily.  Qty: 60 capsule, Refills: 6    Associated Diagnoses: Degeneration of intervertebral disc of lumbar region with discogenic back pain; Lumbar radiculopathy; Degeneration of intervertebral disc of lumbar region with discogenic back pain and lower extremity pain      sumatriptan (IMITREX) 25 MG Tab Take 50 mg by mouth every 2 (two) hours as needed.                 Discharge Diagnosis: Degeneration of intervertebral disc of lumbar region with discogenic back pain [M51.360]  Condition on Discharge: Stable with no complications to procedure   Diet on Discharge: Same as before.  Activity: as per instruction sheet.  Discharge to: Home with a responsible adult.  Follow up: 2-4 weeks       Please call my office or pager at [phone] if experienced any weakness or loss of sensation, fever > 101.5, pain uncontrolled with oral medications, persistent nausea/vomiting/or diarrhea, redness or drainage from the incisions, or any other worrisome concerns. If physician on call was not reached or could not communicate with  our office for any reason please go to the nearest emergency department

## 2025-05-26 NOTE — DISCHARGE INSTRUCTIONS

## 2025-06-09 ENCOUNTER — OFFICE VISIT (OUTPATIENT)
Dept: PAIN MEDICINE | Facility: CLINIC | Age: 35
End: 2025-06-09
Payer: OTHER GOVERNMENT

## 2025-06-09 VITALS
HEIGHT: 73 IN | SYSTOLIC BLOOD PRESSURE: 134 MMHG | WEIGHT: 223.31 LBS | HEART RATE: 95 BPM | BODY MASS INDEX: 29.6 KG/M2 | DIASTOLIC BLOOD PRESSURE: 86 MMHG

## 2025-06-09 DIAGNOSIS — M54.51 VERTEBROGENIC LOW BACK PAIN: Primary | ICD-10-CM

## 2025-06-09 DIAGNOSIS — G89.29 OTHER CHRONIC PAIN: ICD-10-CM

## 2025-06-09 DIAGNOSIS — M51.362 DEGENERATION OF INTERVERTEBRAL DISC OF LUMBAR REGION WITH DISCOGENIC BACK PAIN AND LOWER EXTREMITY PAIN: ICD-10-CM

## 2025-06-09 DIAGNOSIS — M51.360 DEGENERATION OF INTERVERTEBRAL DISC OF LUMBAR REGION WITH DISCOGENIC BACK PAIN: ICD-10-CM

## 2025-06-09 PROCEDURE — 99214 OFFICE O/P EST MOD 30 MIN: CPT | Mod: S$PBB,,,

## 2025-06-09 PROCEDURE — 99999 PR PBB SHADOW E&M-EST. PATIENT-LVL III: CPT | Mod: PBBFAC,,,

## 2025-06-09 PROCEDURE — 99213 OFFICE O/P EST LOW 20 MIN: CPT | Mod: PBBFAC

## 2025-06-09 NOTE — PROGRESS NOTES
Interventional Pain Management - Established Visit  Follow-Up     Subjective:      Patient ID: Alex Washburn is a 35 y.o. male.    Chief Complaint: Follow-up    Referred by: No ref. provider found     Interval History 6/9/2025:  Alex Washburn returns for follow-up after bilateral L5/S1 TFESI on 5/26/2025. He reports 60% relief of leg pain. He reports limited relief of back. He continues to report lower back pain that he reports as severe. Exacerbating factors: sitting prolonged, walking, laying flat on his back. The pain in the back does affect his sleep, he cannot fall asleep or it wakes him up at night. He continues Flexeril, Lyrica and Celebrex with some benefit. Flexeril makes him drowsy. He denies recent health changes. He denies recent falls or trauma. He denies new onset fever/night sweats, urinary incontinence, bowel incontinence, significant weight changes, significant motor weakness or changes, or loss of sensations. His pain today is 5-6/10.  At worst, back pain is 9/10. Leg pain is 4/10.     Interval History 5/13/2025:  Patient presents to clinic for lower back pain. He was seen by spine surgery who did not recommend a surgery at this time. He was seen by neurology who did not find further alarm symptoms and sent the patient for physical therapy, they stopped his tizanidine and started Flexeril. He is also taking Lyrica and Celebrex, which provide moderate relief. The flexeril makes him very drowsy even the next morning. His pain continues to be in the middle lower back, it can radiate down the anterior left leg to the top of the foot and down the anterior right leg, stopping at the knee. Exacerbated by sitting, standing, walking and laying flat. Mitigated by medications or changing positions. Rates the pain as 4/10, can become 8/10. Patient has no new episodes of urinary or bowel incontinence. He denies other red flag symptoms.     Initial HPI (4/23/2025): Alex Washburn Is presenting to the  clinic complaining of lower back pain.  Patient describes the pain as achy, sharp, dull and sore with radiation down the left lower extremity all the way to his foot and down the right lower extremity to his knees.  Patient said the pain is worse with prolonged standing, prolonged walking, sitting and laying flat in bed.  Patient said he has had a diskectomy at L5/S1 in the past which helped with some of his symptoms.  He also had limited relief with epidural injection at an outside pain clinic.  Patient trailed gabapentin and pregabalin and short-term courses of opioids ( Percocets and oxycodone) and reports limited relief.   Patient endorses multiple ED visits for 4 through pain and said he has several episodes of urinary and bowel incontinence.  He presented to the neurosurgery clinic for evaluation and no surgical intervention was recommended and was subsequently referred to the pain Clinic.  Denied any fevers, weight loss.  No recent trauma or falls.  No loss of vision, saddle anesthesia, difficulties with balance or ambulation.    Pain Interventions:  MAIK at L4/5 and L5/S1 at outside facility.   5/26/2025 - bilateral L5/S1 TFESI - 60% relief of leg pain.     Relevant Surgeries:  L5/S1 Hemidisectomy     Conservative Treatment  - Physical Therpy: No   - Home Exercise Program: Yes  - Chiropractic: No     Medications  Gabapentin - no relief   Pregabalin 100 mg BID  Percocet - Outside provider   Oxycodone - Outside provider      Blood thinners: None     Relevant Imaging  MRI LUMBAR SPINE W WO CONTRAST     CLINICAL HISTORY:  Low back pain, symptoms persist with > 6wks conservative treatment;Low back pain, cauda equina syndrome suspected; Dorsalgia, unspecified     TECHNIQUE:  Multiplanar, multisequence MR images were acquired from the thoracolumbar junction to the sacrum with and without intravenous contrast.  None 0.8 cc Gadavist was administered.     COMPARISON:  Lumbar spine radiograph dated 01/10/2025 and MRI  lumbar spine with and without contrast dated 05/03/2024     FINDINGS:  Lumbar spine vertebral body heights appear maintained.  No discrete infiltrative T1 marrow process.  Reported prior discectomy with partial laminectomy at L5-S1.  Advanced disc space narrowing at L5-S1 with mild Modic type I and II endplate change.  Spinal cord terminus lies at L1.  No abnormal nerve root enhancement or paraspinal collection.  Remaining visualized prevertebral and paraspinal soft tissues demonstrate no acute abnormality.     T12-L1: No significant spinal canal stenosis or neural foraminal impingement.     L1-L2: No significant spinal canal stenosis or neural foraminal impingement.     L2-L3: No significant spinal canal stenosis or neural foraminal impingement.     L3-L4: No significant spinal canal stenosis or neural foraminal impingement.     L4-L5: No significant spinal canal stenosis or neural foraminal impingement.     L5-S1: Prior surgical change.  Circumferential bulge with mild central protrusion and adjacent scar along the left lateral recess.  This closely approximates the left transiting S1 nerve root without evident encroachment.  No significant spinal canal stenosis or neural foraminal impingement.     Impression:     Prior surgical change and spondylosis at L5-S1.  See above for detail.  No significant detrimental change from comparison MRI.        Electronically signed by:Anthony Montanez  Date:                                            05/02/2025  Time:                                           08:02       XR LUMBAR SPINE AP AND LATERAL    FINDINGS:  Alignment: Alignment is maintained.     Vertebrae: Vertebral body heights are maintained.  No suspicious appearing lytic or blastic lesions.     Discs and facets: L5-S1 disc space narrowing and spondylitic spurring.  Posterior elements intact.     Impression:     L5-S1 spondylosis    History reviewed. No pertinent past medical history.    Past Surgical History:  "  Procedure Laterality Date    INJECTION, SPINE, LUMBOSACRAL, TRANSFORAMINAL APPROACH Bilateral 5/26/2025    Procedure: LUMBAR TRANSFORAMINAL BILATERAL L5/S1;  Surgeon: Scott Nick MD;  Location: [facility];  Service: Pain Management;  Laterality: Bilateral;  2 WK F/U [place]       Review of patient's allergies indicates:  No Known Allergies    Current Medications[1]    No family history on file.    Social History[2]      Review of Systems   Constitutional: Negative.   HENT: Negative.     Eyes: Negative.    Cardiovascular: Negative.    Respiratory: Negative.     Endocrine: Negative.    Skin: Negative.    Musculoskeletal:  Positive for back pain.   Gastrointestinal: Negative.    Genitourinary: Negative.    Neurological: Negative.    Psychiatric/Behavioral: Negative.         Objective:   /86 (BP Location: Right arm, Patient Position: Sitting)   Pulse 95   Ht 6' 1" (1.854 m)   Wt 101.3 kg (223 lb 5.2 oz)   BMI 29.46 kg/m²     Pain Disability Index Review:      5/13/2025     9:23 AM   Last 3 PDI Scores   Pain Disability Index (PDI) 40     PHYSICAL EXAM   Vitals:    06/09/25 0938   BP: 134/86   Pulse: 95     PHYSICAL EXAMINATION:    GENERAL APPEARANCE: Well appearing, in no acute distress.  PSYCH:  Mood and affect appropriate.  SKIN: Skin color, texture, turgor normal, no rashes or lesions to visible areas. Well-healed surgical scar to mid-lower back.   HEAD/FACE:  Normocephalic, atraumatic.  NECK: No pain to palpation over the cervical paraspinous muscles. Spurling Negative. No pain with neck flexion, extension, or lateral flexion.   CARDIO: Rate regular.  No lower extremity edema. Capillary refill <2 seconds.   PULM: Bilateral chest rise. No apparent respiratory distress.   GI:  Non-distended  BACK: Straight leg raising in the sitting position is negative to radicular pain. No pain to palpation over the spine or costovertebral angles. Limtied AROM with pain on extension and facet loading to the right. " Tenderness to palpation to right facet joints. No pain to palpation to bilateral SI joints.   EXTREMITIES: Peripheral joint ROM is full and pain free without obvious instability or laxity in all four extremities. No deformities, edema, or skin discoloration.   MUSCULOSKELETAL: Hip and SI joint provocative maneuvers are negative. Bilateral upper and lower extremity strength is normal and symmetric.  No atrophy or tone abnormalities are noted.  NEURO: Bilateral upper and lower extremity coordination and muscle stretch reflexes are physiologic and symmetric.  Li's absent. Plantar response are downgoing. No clonus noted. No loss of sensation is noted.  GAIT: normal.     Assessment:       Encounter Diagnoses   Name Primary?    Degeneration of intervertebral disc of lumbar region with discogenic back pain and lower extremity pain     Degeneration of intervertebral disc of lumbar region with discogenic back pain     Other chronic pain     Vertebrogenic low back pain Yes             Plan:   We discussed with the patient the assessment and recommendations. The following is the plan we agreed on:       Images reviewed and discussed with patient.    He is s/p bilateral L5/S1 TFESI with 60% relief of leg pain. He reports no relief of back pain.  Procedure order placed for Intracept at L5 and S1 with Dr Cordova  The patient meets the indications for use as outlined by the FDA. He has had lower back pain for > 6 months and has failed numerous attempts to resolve the pain with conservative measures for > 6 months. Lumbar MRI shows endplate edema at L5 and S1 (listed on radiology report). There are visible Modic changes at L5 and S1 on independent review of imaging. ESIs have not helped his symptoms. The patient's predominant physical complaint appears due to Modic changes. The patient underwent careful psychological screening by me and I do not see any contraindications with proceedings. She completed a PHQ-2 today with a  score of 0. There is no evidence of untreated substance abuse disorder. There are no other contraindications at this time.  [place] Pain Disability Questionnaire today with 60% disability (Severe)  Reliavent Consent signed today.   Follow-up 1 week after Intracept for post-op  Dr. Cordova was consulted on the patient and agrees with this plan.      Yessenia Verdin NP  06/09/2025           [1]   Current Outpatient Medications   Medication Sig Dispense Refill    celecoxib (CELEBREX) 200 MG capsule Take 1 capsule (200 mg total) by mouth 2 (two) times daily. 60 capsule 0    cyclobenzaprine (FLEXERIL) 10 MG tablet Take 1 tablet (10 mg total) by mouth 3 (three) times daily as needed for Muscle spasms (back spasms). 30 tablet 3    pregabalin (LYRICA) 150 MG capsule Take 1 capsule (150 mg total) by mouth 2 (two) times daily. 60 capsule 6    sumatriptan (IMITREX) 25 MG Tab Take 50 mg by mouth every 2 (two) hours as needed.       No current facility-administered medications for this visit.   [2]   Social History  Socioeconomic History    Marital status:    Tobacco Use    Smoking status: Never    Smokeless tobacco: Current     Types: Chew    Tobacco comments:     Can a week   Substance and Sexual Activity    Alcohol use: Not Currently    Drug use: Never    Sexual activity: Yes     Partners: Female     Social Drivers of Health     Financial Resource Strain: Low Risk  (4/2/2025)    Overall Financial Resource Strain (CARDIA)     Difficulty of Paying Living Expenses: Not very hard   Food Insecurity: No Food Insecurity (4/2/2025)    Hunger Vital Sign     Worried About Running Out of Food in the Last Year: Never true     Ran Out of Food in the Last Year: Never true   Transportation Needs: No Transportation Needs (4/2/2025)    PRAPARE - Transportation     Lack of Transportation (Medical): No     Lack of Transportation (Non-Medical): No   Physical Activity: Sufficiently Active (4/2/2025)    Exercise Vital Sign     Days of  Exercise per Week: 6 days     Minutes of Exercise per Session: 60 min   Stress: Stress Concern Present (4/2/2025)    [employer] - Occupational Stress Questionnaire     Feeling of Stress : Very much   Housing Stability: Low Risk  (4/2/2025)    Housing Stability Vital Sign     Unable to Pay for Housing in the Last Year: No     Number of Times Moved in the Last Year: 0     Homeless in the Last Year: No

## 2025-06-17 ENCOUNTER — PATIENT MESSAGE (OUTPATIENT)
Dept: PAIN MEDICINE | Facility: OTHER | Age: 35
End: 2025-06-17
Payer: OTHER GOVERNMENT

## 2025-06-17 DIAGNOSIS — M54.51 VERTEBROGENIC LOW BACK PAIN: Primary | ICD-10-CM

## 2025-07-01 ENCOUNTER — ANESTHESIA EVENT (OUTPATIENT)
Dept: SURGERY | Facility: OTHER | Age: 35
End: 2025-07-01
Payer: OTHER GOVERNMENT

## 2025-07-01 NOTE — ANESTHESIA PREPROCEDURE EVALUATION
07/01/2025  Alex Washburn is a 35 y.o., male.      Pre-op Assessment    I have reviewed the Patient Summary Reports.     I have reviewed the Nursing Notes. I have reviewed the NPO Status.   I have reviewed the Medications.     Review of Systems  Anesthesia Hx:  No problems with previous Anesthesia             Denies Family Hx of Anesthesia complications.    Denies Personal Hx of Anesthesia complications.                    Social:  Non-Smoker       Cardiovascular:  Exercise tolerance: good          Denies Angina.         Tachycardia, reports saw cardiology and was told was just a regular tachy arrhythmia                            Pulmonary:       Denies Shortness of breath.  Sleep Apnea (wears cpap)                Hepatic/GI:     GERD, well controlled                Musculoskeletal:     history of L5/S1 microdiscectomy in 2023       Spine Disorders: lumbar Chronic Pain           Neurological:      Headaches                                     Physical Exam  General: Well nourished, Cooperative, Alert and Oriented    Airway:  Mallampati: I   Mouth Opening: Normal  TM Distance: Normal  Tongue: Normal  Neck ROM: Normal ROM    Dental:  Intact        Anesthesia Plan  Type of Anesthesia, risks & benefits discussed:    Anesthesia Type: Gen ETT  Intra-op Monitoring Plan: Standard ASA Monitors  Post Op Pain Control Plan: multimodal analgesia  Induction:  IV  ASA Score: 2  Day of Surgery Review of History & Physical: H&P Update referred to the surgeon/provider.  Anesthesia Plan Notes: CBC, BMP, EKG  EKG: Sinus tach     Ready For Surgery From Anesthesia Perspective.     .

## 2025-07-02 ENCOUNTER — HOSPITAL ENCOUNTER (OUTPATIENT)
Dept: PREADMISSION TESTING | Facility: OTHER | Age: 35
Discharge: HOME | End: 2025-07-02
Attending: STUDENT IN AN ORGANIZED HEALTH CARE EDUCATION/TRAINING PROGRAM
Payer: OTHER GOVERNMENT

## 2025-07-02 VITALS
RESPIRATION RATE: 16 BRPM | WEIGHT: 223 LBS | TEMPERATURE: 99 F | OXYGEN SATURATION: 96 % | SYSTOLIC BLOOD PRESSURE: 123 MMHG | HEART RATE: 118 BPM | BODY MASS INDEX: 29.55 KG/M2 | DIASTOLIC BLOOD PRESSURE: 85 MMHG | HEIGHT: 73 IN

## 2025-07-02 DIAGNOSIS — Z01.818 PREOP TESTING: Primary | ICD-10-CM

## 2025-07-02 LAB
ABSOLUTE EOSINOPHIL (OHS): 0.46 K/UL
ABSOLUTE MONOCYTE (OHS): 0.92 K/UL (ref 0.3–1)
ABSOLUTE NEUTROPHIL COUNT (OHS): 7.64 K/UL (ref 1.8–7.7)
ANION GAP (OHS): 11 MMOL/L (ref 8–16)
BASOPHILS # BLD AUTO: 0.08 K/UL
BASOPHILS NFR BLD AUTO: 0.7 %
BUN SERPL-MCNC: 10 MG/DL (ref 6–20)
CALCIUM SERPL-MCNC: 9.4 MG/DL (ref 8.7–10.5)
CHLORIDE SERPL-SCNC: 109 MMOL/L (ref 95–110)
CO2 SERPL-SCNC: 21 MMOL/L (ref 23–29)
CREAT SERPL-MCNC: 1 MG/DL (ref 0.5–1.4)
ERYTHROCYTE [DISTWIDTH] IN BLOOD BY AUTOMATED COUNT: 12.6 % (ref 11.5–14.5)
GFR SERPLBLD CREATININE-BSD FMLA CKD-EPI: >60 ML/MIN/1.73/M2
GLUCOSE SERPL-MCNC: 105 MG/DL (ref 70–110)
HCT VFR BLD AUTO: 43.8 % (ref 40–54)
HGB BLD-MCNC: 15.4 GM/DL (ref 14–18)
IMM GRANULOCYTES # BLD AUTO: 0.09 K/UL (ref 0–0.04)
IMM GRANULOCYTES NFR BLD AUTO: 0.8 % (ref 0–0.5)
LYMPHOCYTES # BLD AUTO: 2.04 K/UL (ref 1–4.8)
MCH RBC QN AUTO: 29.1 PG (ref 27–31)
MCHC RBC AUTO-ENTMCNC: 35.2 G/DL (ref 32–36)
MCV RBC AUTO: 83 FL (ref 82–98)
NUCLEATED RBC (/100WBC) (OHS): 0 /100 WBC
OHS QRS DURATION: 72 MS
OHS QTC CALCULATION: 441 MS
PLATELET # BLD AUTO: 349 K/UL (ref 150–450)
PMV BLD AUTO: 9 FL (ref 9.2–12.9)
POTASSIUM SERPL-SCNC: 3.9 MMOL/L (ref 3.5–5.1)
RBC # BLD AUTO: 5.3 M/UL (ref 4.6–6.2)
RELATIVE EOSINOPHIL (OHS): 4.1 %
RELATIVE LYMPHOCYTE (OHS): 18.2 % (ref 18–48)
RELATIVE MONOCYTE (OHS): 8.2 % (ref 4–15)
RELATIVE NEUTROPHIL (OHS): 68 % (ref 38–73)
SODIUM SERPL-SCNC: 141 MMOL/L (ref 136–145)
WBC # BLD AUTO: 11.23 K/UL (ref 3.9–12.7)

## 2025-07-02 PROCEDURE — 93010 ELECTROCARDIOGRAM REPORT: CPT | Mod: ,,, | Performed by: INTERNAL MEDICINE

## 2025-07-02 PROCEDURE — 93005 ELECTROCARDIOGRAM TRACING: CPT

## 2025-07-02 PROCEDURE — 85025 COMPLETE CBC W/AUTO DIFF WBC: CPT

## 2025-07-02 PROCEDURE — 80048 BASIC METABOLIC PNL TOTAL CA: CPT

## 2025-07-02 RX ORDER — FLUTICASONE PROPIONATE 50 MCG
SPRAY, SUSPENSION (ML) NASAL
COMMUNITY
Start: 2025-04-10

## 2025-07-02 RX ORDER — FLUOXETINE HYDROCHLORIDE 40 MG/1
CAPSULE ORAL
COMMUNITY

## 2025-07-02 RX ORDER — SODIUM CHLORIDE, SODIUM LACTATE, POTASSIUM CHLORIDE, CALCIUM CHLORIDE 600; 310; 30; 20 MG/100ML; MG/100ML; MG/100ML; MG/100ML
INJECTION, SOLUTION INTRAVENOUS CONTINUOUS
OUTPATIENT
Start: 2025-07-02

## 2025-07-02 RX ORDER — LIDOCAINE HYDROCHLORIDE 10 MG/ML
0.5 INJECTION, SOLUTION EPIDURAL; INFILTRATION; INTRACAUDAL; PERINEURAL ONCE
OUTPATIENT
Start: 2025-07-02 | End: 2025-07-02

## 2025-07-02 RX ORDER — OMEPRAZOLE 40 MG/1
CAPSULE, DELAYED RELEASE ORAL
COMMUNITY
Start: 2024-10-02

## 2025-07-02 RX ORDER — HYDROXYZINE HYDROCHLORIDE 25 MG/1
TABLET, FILM COATED ORAL
COMMUNITY

## 2025-07-02 NOTE — DISCHARGE INSTRUCTIONS
Information to Prepare you for your Surgery    PRE-ADMIT TESTING   [address]  [place] [facility]  [address]   [phone]  - KIKA Piña    Your surgery has been scheduled at [facility]. We are pleased to have the opportunity to serve you. For Further Information please call [phone].    On the day of surgery please report to Registration on the 1st floor of the [facility].    CONTACT YOUR PHYSICIAN'S OFFICE THE DAY PRIOR TO YOUR SURGERY TO OBTAIN YOUR ARRIVAL TIME.     The evening before surgery do not eat anything after 9 p.m. ( this includes hard candy, chewing gum and mints).  You may only have GATORADE /  POWERADE AND WATER  from 9 p.m. until you leave your home.   DRINK AT LEAST 12 OUNCES THE MORNING OF SURGERY    DO NOT DRINK ANY LIQUIDS ON THE WAY TO THE HOSPITAL.      Why does your anesthesiologist allow you to drink Gatorade/Powerade before surgery?    Gatorade/Powerade helps to increase your comfort before surgery and to decrease your nausea after surgery.  The carbohydrates in the Gatorade/Powerade help reduce your body's stress response to surgery.  If you are diabetic, drink only water prior to surgery.       Outpatient Surgery- May allow 2 adults (18 and older)/ Support Persons (1 being the designated ) for all surgical/procedural patients. A breastfeeding mother will be allowed her infant and 2 adult Support Persons. No one under the age of 18 will be allowed in the building.          Angiogram Patients: Take medications as instructed by your physician, including aspirin.     Surgery Patients:  If you take ASPIRIN - Your PHYSICIAN/SURGEON will need to inform you IF/OR when you need to stop taking aspirin prior to your surgery.     Starting the week prior to surgery, do not take any medications containing IBUPROFEN or NSAIDS (Advil, Aleve, BC, Celebrex, Goody's, Ketorolac, Meloxicam, Mobic, Motrin, Naproxen, Toradol, etc).  If you are  not sure if you should take a medication please call your surgeon's office.  You may take Tylenol.    Do Not Wear any make-up (especially eye make-up) to surgery. Please remove any false eyelashes or eyelash extensions. If you arrive the day of surgery with makeup/eyelashes on you will be required to remove prior to surgery. (There is a risk of corneal abrasions if eye makeup/eyelash extensions are not removed)    Leave all valuables at home.   Do Not wear any jewelry or watches, including any metal in body piercings. Jewelry must be removed prior to coming to the hospital.  There is a possibility that rings that are unable to be removed may be cut off if they are on the surgical extremity.    Please remove all hair extensions, wigs, clips and any other metal accessories/ ornaments from your hair.  These items may pose a flammable/fire risk in Surgery and must be removed.    Do not shave your surgical area at least 5 days prior to your surgery. The surgical prep will be performed at the hospital according to Infection Control regulations.    Contact Lens must be removed before surgery. Either do not wear the contact lens or bring a case and solution for storage.  Please bring a container for eyeglasses or dentures as required.  Bring any paperwork your physician has provided, such as consent forms,  history and physicals, doctor's orders, etc.   Bring comfortable clothes that are loose fitting to wear upon discharge. Take into consideration the type of surgery being performed.  Maintain your diet as advised per your physician the day prior to surgery.    Adequate rest the night before surgery is advised.   Park in the Parking lot behind the hospital or in the [facility] across the street from the parking lot. Parking is complimentary.  If you will be discharged the same day as your procedure, please arrange for a responsible adult to drive you home or to accompany you if traveling by taxi.   YOU WILL  NOT BE PERMITTED TO DRIVE OR TO LEAVE THE HOSPITAL ALONE AFTER SURGERY.   If you are being discharged the same day, it is strongly recommended that you arrange for someone to remain with you for the first 24 hrs following your surgery.    The Surgeon will speak to your family/visitor after your surgery regarding the outcome of your surgery and post op care.  The Surgeon may speak to you after your surgery, but there is a possibility you may not remember the details.  Please check with your family members regarding the conversation with the Surgeon.         Bathing Instructions with Hibiclens:    Shower the evening before and morning of your procedure with Chlorhexidine (Hibiclens)  do not use Chlorhexidine on your face or genitals. Do not get in your eyes.  Wash your face with water and your regular face wash/soap  Use your regular shampoo  Apply Chlorhexidine (Hibiclens) directly on your skin or on a wet washcloth and wash gently. When showering: Move away from the shower stream when applying Chlorhexidine (Hibiclens) to avoid rinsing off too soon.  Rinse thoroughly with warm water  Do not dilute Chlorhexidine (Hibiclens)   Dry off as usual, do not use any deodorant, powder, body lotions, perfume, after shave or cologne.     We strongly recommend whoever is bringing you home be present for discharge instructions.  This will ensure a thorough understanding for your post op home care.    If the patient has fever, cough, or signs/symptoms of Flu or Covid please do not come in for your surgery.  First, contact the pre op department at [phone] (unit opens at 5AM) and then contact your surgeon and your primary care physician for further instructions.      If applicable, please bring any blood pressure and/or diabetes medications with you the day of surgery.  If on home oxygen, even at night, please bring your portable oxygen tank with you the day of surgery in case it is needed for your discharge.       Thanks,  KIKA Piña

## 2025-07-07 ENCOUNTER — HOSPITAL ENCOUNTER (OUTPATIENT)
Facility: OTHER | Age: 35
Discharge: HOME | End: 2025-07-07
Attending: STUDENT IN AN ORGANIZED HEALTH CARE EDUCATION/TRAINING PROGRAM | Admitting: STUDENT IN AN ORGANIZED HEALTH CARE EDUCATION/TRAINING PROGRAM
Payer: OTHER GOVERNMENT

## 2025-07-07 ENCOUNTER — ANESTHESIA (OUTPATIENT)
Dept: SURGERY | Facility: OTHER | Age: 35
End: 2025-07-07
Payer: OTHER GOVERNMENT

## 2025-07-07 DIAGNOSIS — M54.16 LUMBAR RADICULOPATHY: Primary | ICD-10-CM

## 2025-07-07 DIAGNOSIS — M54.51 VERTEBROGENIC LOW BACK PAIN: ICD-10-CM

## 2025-07-07 PROCEDURE — 63600175 PHARM REV CODE 636 W HCPCS: Performed by: NURSE ANESTHETIST, CERTIFIED REGISTERED

## 2025-07-07 PROCEDURE — 71000016 HC POSTOP RECOV ADDL HR: Performed by: STUDENT IN AN ORGANIZED HEALTH CARE EDUCATION/TRAINING PROGRAM

## 2025-07-07 PROCEDURE — 71000033 HC RECOVERY, INTIAL HOUR: Performed by: STUDENT IN AN ORGANIZED HEALTH CARE EDUCATION/TRAINING PROGRAM

## 2025-07-07 PROCEDURE — 25000003 PHARM REV CODE 250: Performed by: NURSE ANESTHETIST, CERTIFIED REGISTERED

## 2025-07-07 PROCEDURE — 64628 TRML DSTRJ IOS BVN 1ST 2 L/S: CPT | Mod: ,,, | Performed by: STUDENT IN AN ORGANIZED HEALTH CARE EDUCATION/TRAINING PROGRAM

## 2025-07-07 PROCEDURE — 36000707: Performed by: STUDENT IN AN ORGANIZED HEALTH CARE EDUCATION/TRAINING PROGRAM

## 2025-07-07 PROCEDURE — 71000015 HC POSTOP RECOV 1ST HR: Performed by: STUDENT IN AN ORGANIZED HEALTH CARE EDUCATION/TRAINING PROGRAM

## 2025-07-07 PROCEDURE — 37000008 HC ANESTHESIA 1ST 15 MINUTES: Performed by: STUDENT IN AN ORGANIZED HEALTH CARE EDUCATION/TRAINING PROGRAM

## 2025-07-07 PROCEDURE — 27201423 OPTIME MED/SURG SUP & DEVICES STERILE SUPPLY: Performed by: STUDENT IN AN ORGANIZED HEALTH CARE EDUCATION/TRAINING PROGRAM

## 2025-07-07 PROCEDURE — C1886 CATHETER, ABLATION: HCPCS | Performed by: STUDENT IN AN ORGANIZED HEALTH CARE EDUCATION/TRAINING PROGRAM

## 2025-07-07 PROCEDURE — 37000009 HC ANESTHESIA EA ADD 15 MINS: Performed by: STUDENT IN AN ORGANIZED HEALTH CARE EDUCATION/TRAINING PROGRAM

## 2025-07-07 PROCEDURE — 63600175 PHARM REV CODE 636 W HCPCS: Performed by: STUDENT IN AN ORGANIZED HEALTH CARE EDUCATION/TRAINING PROGRAM

## 2025-07-07 PROCEDURE — 36000706: Performed by: STUDENT IN AN ORGANIZED HEALTH CARE EDUCATION/TRAINING PROGRAM

## 2025-07-07 PROCEDURE — 63600175 PHARM REV CODE 636 W HCPCS

## 2025-07-07 RX ORDER — ROCURONIUM BROMIDE 10 MG/ML
INJECTION, SOLUTION INTRAVENOUS
Status: DISCONTINUED | OUTPATIENT
Start: 2025-07-07 | End: 2025-07-07

## 2025-07-07 RX ORDER — SODIUM CHLORIDE 0.9 % (FLUSH) 0.9 %
3 SYRINGE (ML) INJECTION
Status: DISCONTINUED | OUTPATIENT
Start: 2025-07-07 | End: 2025-07-07 | Stop reason: HOSPADM

## 2025-07-07 RX ORDER — GLUCAGON 1 MG
1 KIT INJECTION
Status: DISCONTINUED | OUTPATIENT
Start: 2025-07-07 | End: 2025-07-07 | Stop reason: HOSPADM

## 2025-07-07 RX ORDER — SODIUM CHLORIDE 9 MG/ML
INJECTION, SOLUTION INTRAVENOUS CONTINUOUS
Status: DISCONTINUED | OUTPATIENT
Start: 2025-07-07 | End: 2025-07-07 | Stop reason: HOSPADM

## 2025-07-07 RX ORDER — ONDANSETRON HYDROCHLORIDE 2 MG/ML
INJECTION, SOLUTION INTRAVENOUS
Status: DISCONTINUED | OUTPATIENT
Start: 2025-07-07 | End: 2025-07-07

## 2025-07-07 RX ORDER — SODIUM CHLORIDE, SODIUM LACTATE, POTASSIUM CHLORIDE, CALCIUM CHLORIDE 600; 310; 30; 20 MG/100ML; MG/100ML; MG/100ML; MG/100ML
INJECTION, SOLUTION INTRAVENOUS CONTINUOUS
Status: DISCONTINUED | OUTPATIENT
Start: 2025-07-07 | End: 2025-07-07 | Stop reason: HOSPADM

## 2025-07-07 RX ORDER — HYDROMORPHONE HYDROCHLORIDE 2 MG/ML
INJECTION, SOLUTION INTRAMUSCULAR; INTRAVENOUS; SUBCUTANEOUS
Status: DISCONTINUED | OUTPATIENT
Start: 2025-07-07 | End: 2025-07-07

## 2025-07-07 RX ORDER — OXYCODONE HYDROCHLORIDE 5 MG/1
5 TABLET ORAL
Status: DISCONTINUED | OUTPATIENT
Start: 2025-07-07 | End: 2025-07-07 | Stop reason: HOSPADM

## 2025-07-07 RX ORDER — LIDOCAINE HYDROCHLORIDE AND EPINEPHRINE 10; 10 UG/ML; MG/ML
INJECTION, SOLUTION INFILTRATION; PERINEURAL
Status: DISCONTINUED | OUTPATIENT
Start: 2025-07-07 | End: 2025-07-07 | Stop reason: HOSPADM

## 2025-07-07 RX ORDER — CEFAZOLIN 2 G/1
2 INJECTION, POWDER, FOR SOLUTION INTRAMUSCULAR; INTRAVENOUS
Status: COMPLETED | OUTPATIENT
Start: 2025-07-07 | End: 2025-07-07

## 2025-07-07 RX ORDER — PROCHLORPERAZINE EDISYLATE 5 MG/ML
5 INJECTION INTRAMUSCULAR; INTRAVENOUS EVERY 30 MIN PRN
Status: DISCONTINUED | OUTPATIENT
Start: 2025-07-07 | End: 2025-07-07 | Stop reason: HOSPADM

## 2025-07-07 RX ORDER — PROPOFOL 10 MG/ML
VIAL (ML) INTRAVENOUS
Status: DISCONTINUED | OUTPATIENT
Start: 2025-07-07 | End: 2025-07-07

## 2025-07-07 RX ORDER — LIDOCAINE HYDROCHLORIDE 10 MG/ML
0.5 INJECTION, SOLUTION EPIDURAL; INFILTRATION; INTRACAUDAL; PERINEURAL ONCE
Status: DISCONTINUED | OUTPATIENT
Start: 2025-07-07 | End: 2025-07-07 | Stop reason: HOSPADM

## 2025-07-07 RX ORDER — MIDAZOLAM HYDROCHLORIDE 1 MG/ML
INJECTION INTRAMUSCULAR; INTRAVENOUS
Status: DISCONTINUED | OUTPATIENT
Start: 2025-07-07 | End: 2025-07-07

## 2025-07-07 RX ORDER — DEXAMETHASONE SODIUM PHOSPHATE 4 MG/ML
INJECTION, SOLUTION INTRA-ARTICULAR; INTRALESIONAL; INTRAMUSCULAR; INTRAVENOUS; SOFT TISSUE
Status: DISCONTINUED | OUTPATIENT
Start: 2025-07-07 | End: 2025-07-07

## 2025-07-07 RX ORDER — LIDOCAINE HYDROCHLORIDE 20 MG/ML
INJECTION INTRAVENOUS
Status: DISCONTINUED | OUTPATIENT
Start: 2025-07-07 | End: 2025-07-07

## 2025-07-07 RX ORDER — DEXMEDETOMIDINE HYDROCHLORIDE 100 UG/ML
INJECTION, SOLUTION INTRAVENOUS
Status: DISCONTINUED | OUTPATIENT
Start: 2025-07-07 | End: 2025-07-07

## 2025-07-07 RX ORDER — FENTANYL CITRATE 50 UG/ML
INJECTION, SOLUTION INTRAMUSCULAR; INTRAVENOUS
Status: DISCONTINUED | OUTPATIENT
Start: 2025-07-07 | End: 2025-07-07

## 2025-07-07 RX ORDER — HYDROMORPHONE HYDROCHLORIDE 2 MG/ML
0.4 INJECTION, SOLUTION INTRAMUSCULAR; INTRAVENOUS; SUBCUTANEOUS EVERY 5 MIN PRN
Status: DISCONTINUED | OUTPATIENT
Start: 2025-07-07 | End: 2025-07-07 | Stop reason: HOSPADM

## 2025-07-07 RX ADMIN — HYDROMORPHONE HYDROCHLORIDE 0.4 MG: 2 INJECTION, SOLUTION INTRAMUSCULAR; INTRAVENOUS; SUBCUTANEOUS at 01:07

## 2025-07-07 RX ADMIN — GLYCOPYRROLATE 0.1 MG: 0.2 INJECTION, SOLUTION INTRAMUSCULAR; INTRAVENOUS at 12:07

## 2025-07-07 RX ADMIN — PROPOFOL 50 MG: 10 INJECTION, EMULSION INTRAVENOUS at 01:07

## 2025-07-07 RX ADMIN — DEXMEDETOMIDINE 4 MCG: 200 INJECTION, SOLUTION INTRAVENOUS at 12:07

## 2025-07-07 RX ADMIN — ROCURONIUM BROMIDE 50 MG: 10 INJECTION INTRAVENOUS at 11:07

## 2025-07-07 RX ADMIN — SUGAMMADEX 200 MG: 100 INJECTION, SOLUTION INTRAVENOUS at 01:07

## 2025-07-07 RX ADMIN — ONDANSETRON 4 MG: 2 INJECTION INTRAMUSCULAR; INTRAVENOUS at 01:07

## 2025-07-07 RX ADMIN — HYDROMORPHONE HYDROCHLORIDE 0.2 MG: 2 INJECTION, SOLUTION INTRAMUSCULAR; INTRAVENOUS; SUBCUTANEOUS at 12:07

## 2025-07-07 RX ADMIN — SODIUM CHLORIDE, SODIUM LACTATE, POTASSIUM CHLORIDE, AND CALCIUM CHLORIDE: .6; .31; .03; .02 INJECTION, SOLUTION INTRAVENOUS at 01:07

## 2025-07-07 RX ADMIN — PROPOFOL 40 MG: 10 INJECTION, EMULSION INTRAVENOUS at 12:07

## 2025-07-07 RX ADMIN — CEFAZOLIN 2 G: 330 INJECTION, POWDER, FOR SOLUTION INTRAMUSCULAR; INTRAVENOUS at 12:07

## 2025-07-07 RX ADMIN — LIDOCAINE HYDROCHLORIDE 100 MG: 20 INJECTION, SOLUTION INTRAVENOUS at 11:07

## 2025-07-07 RX ADMIN — MIDAZOLAM HYDROCHLORIDE 2 MG: 1 INJECTION, SOLUTION INTRAMUSCULAR; INTRAVENOUS at 11:07

## 2025-07-07 RX ADMIN — ROCURONIUM BROMIDE 10 MG: 10 INJECTION INTRAVENOUS at 12:07

## 2025-07-07 RX ADMIN — DEXAMETHASONE SODIUM PHOSPHATE 8 MG: 4 INJECTION INTRA-ARTICULAR; INTRALESIONAL; INTRAMUSCULAR; INTRAVENOUS; SOFT TISSUE at 12:07

## 2025-07-07 RX ADMIN — SODIUM CHLORIDE, SODIUM LACTATE, POTASSIUM CHLORIDE, AND CALCIUM CHLORIDE: .6; .31; .03; .02 INJECTION, SOLUTION INTRAVENOUS at 11:07

## 2025-07-07 RX ADMIN — DEXMEDETOMIDINE 8 MCG: 200 INJECTION, SOLUTION INTRAVENOUS at 12:07

## 2025-07-07 RX ADMIN — FENTANYL CITRATE 100 MCG: 50 INJECTION, SOLUTION INTRAMUSCULAR; INTRAVENOUS at 11:07

## 2025-07-07 RX ADMIN — PROPOFOL 160 MG: 10 INJECTION, EMULSION INTRAVENOUS at 11:07

## 2025-07-07 NOTE — DISCHARGE INSTRUCTIONS
Pain Post Surgery Instructions    If you have SEVERE headache with nausea, bleeding, weakness and extreme pain, please call office ([phone] or pager number [phone]). Unless you usually have this type of migraine headache.   If you develop fever (greater than 101 F) or have any redness, swelling, or drainage at the injection site(s), please call off ([phone] or pager number [phone]). This may be a sign of infection.   If a rash or whelps (like hives) occur, please call the office ([phone] or pager number [phone]).  If you are a heart patient, please watch for symptoms such as swelling in your hands and feet and shortness of breath. If any of these symptoms occur, please notify your primary care/ heart doctor and go to the nearest emergency room.  If you have high blood pressure, you may experience an increase in your blood pressure over the next two weeks. Please continue to monitor your blood pressure and contact your primary care provider if your blood pressure does not return to baseline.    NO HEAT TO THE SURGERY SITE for the next 7 days including: bath or shower, heating pad, moist heat, and / or hot tubs.   DO NOT DRIVE OR OPERATE HEAVY MACHINERY UNTIL FOLLOW UP.   Avoid heavy lifting and excessive physical activity until follow up appointment.  OK to resume all medications unless otherwise directed by your doctor.      Activity  You may be up and about to take care of your personal needs but avoid any strenuous activity. Do not put  yourself in a position where you could fall.  Do not lift more than 8 pounds (equivalent to about a gallon of milk). Avoid pushing or pulling activity.  Going up and down stairs is permissible. Be sure to use the handrails and take one step at a time until  comfortable. Take precautions to prevent falls and use assistance if unsure. If you were given JD hose  stockings you may discontinue once you are up and walking daily.  Avoid bending or twisting  at the waist. Bend at your knees (squat) when picking up objects. Avoid  sitting for longer than 45 minutes to one hour at a time. Sitting for longer periods of time may add to  your discomfort. Take a 10 minute break to get up and move around or lie down before sitting again.    Exercise  Walking is the best exercise after surgery and you need to walk DAILY. You should not engage in any  other exercise until instructed by your physician. Gradually increase the distance you walk and, if  weather permits, you may walk outside. You should be able to gradually increase your distance until you  can walk about one mile within one to two months after surgery. Ladies avoid high heels for the first  month after surgery.  Incision Care  Keep the incision dry for 48 hours after surgery. You do not need to apply any ointment. You do not  need to keep the incision covered unless there is drainage from the incision. Contact us if drainage  persists for more than 2 days or if you have redness or swelling around the incision.  You will have dermabond (skin glue) over the incision. Do NOT pick at the incision.  If you have fevers or chills, take your temperature with a thermometer. If you have a temperature of 101  degrees Fahrenheit or 38.3 degrees Celsius or higher, contact our office.  Bathing You may do sponge bath for the first 48 hours.  After 48 hours, you can do showers, however please cover the incision to keep it from getting wet.  I recommend Glad Press-and-Seal.   Avoid scrubbing your incision site. Do  NOT soak the incision; so avoid baths, hot tubs or swimming for 1 month after surgery. It is normal for  the incision site to itch, but avoid scratching.    Driving  Do not drive for the first week. You may ride in an automobile for short distances as tolerated.    Diet    Eat a healthy, well balanced diet and avoid extra calories. You may have a decreased appetite after  surgery.  Constipation  You may be constipated  after your surgery, so increase your intake of fiber (fruits and vegetables) and  fluid (unless instructed otherwise). You may use your choice of over-the-counter laxatives (such as  Senokot S, Dulcolax, Colace, or Milk of Magnesia). If you do not have a bowel movement, use an overthe-counter enema (i.e. Fleets Enema) as indicated on the bottle. If you are still unable to have a bowel  movement, or have nausea, vomiting or abdominal bloating, contact your family doctor for instructions.    Smoking  You should not smoke after surgery. Smoking decreases the rate of skin and bone healing. Smoking also  interferes with the effectiveness of your pain medication. The hospital campuses are smoke-free and you  will not be allowed to go outside to smoke. Contact your primary care physician for smoking cessation  options prior to surgery if needed.    Office Follow-up  You will need a post-operative appointment for an incision check and follow-up at 7-10 days after  Surgery. You will also be seen at 3 months after surgery.       Please call my office or pager at [phone] if experienced any weakness or loss of sensation, fever > 101.5, pain uncontrolled with oral medications, persistent nausea/vomiting/or diarrhea, redness or drainage from the incisions, or any other worrisome concerns. If physician on call was not reached or could not communicate with our office for any reason please go to the nearest emergency department

## 2025-07-07 NOTE — TRANSFER OF CARE
Anesthesia Transfer of Care Note    Patient: Alex Washburn    Procedure(s) Performed: Procedure(s) (LRB):  INTRACEPT L5 AND S1 RELIEVANT REP (N/A)    Patient location: PACU    Anesthesia Type: general    Transport from OR: Transported from OR on 6-10 L/min O2 by face mask with adequate spontaneous ventilation    Post pain: adequate analgesia    Post assessment: no apparent anesthetic complications and tolerated procedure well    Post vital signs: stable    Level of consciousness: awake and alert    Nausea/Vomiting: no nausea/vomiting    Complications: none    Transfer of care protocol was followed    Last vitals: Visit Vitals  /78 (BP Location: Left arm, Patient Position: Lying)   Pulse 79   Temp 36.6 °C (97.8 °F) (Oral)   Resp 18   SpO2 98%

## 2025-07-07 NOTE — PLAN OF CARE
Alex Wu has met all discharge criteria from Phase II. Vital Signs are stable, ambulating  without difficulty. Discharge instructions given, patient verbalized understanding. Discharged from facility via wheelchair in stable condition.

## 2025-07-07 NOTE — ANESTHESIA PROCEDURE NOTES
Intubation    Date/Time: 7/7/2025 12:02 PM    Performed by: Nereida Narayan CRNA  Authorized by: Rachid Sky MD    Intubation:     Induction:  Intravenous    Intubated:  Postinduction    Mask Ventilation:  Easy mask    Attempts:  1    Attempted By:  CRNA    Method of Intubation:  Video laryngoscopy    Blade:  Cohen 3    Laryngeal View Grade: Grade I - full view of cords      Difficult Airway Encountered?: No      Complications:  None    Airway Device:  Oral endotracheal tube    Airway Device Size:  7.5    Style/Cuff Inflation:  Cuffed (inflated to minimal occlusive pressure)    Inflation Amount (mL):  5    Tube secured:  22    Secured at:  The lips    Placement Verified By:  Capnometry    Complicating Factors:  None    Findings Post-Intubation:  BS equal bilateral and atraumatic/condition of teeth unchanged

## 2025-07-07 NOTE — ANESTHESIA POSTPROCEDURE EVALUATION
Anesthesia Post Evaluation    Patient: Alex Washburn    Procedure(s) Performed: Procedure(s) (LRB):  INTRACEPT L5 AND S1 RELIEVANT REP (N/A)    Final Anesthesia Type: general      Patient location during evaluation: PACU  Patient participation: Yes- Able to Participate  Level of consciousness: awake and alert  Post-procedure vital signs: reviewed and stable  Pain management: adequate  Airway patency: patent    PONV status at discharge: No PONV  Anesthetic complications: no      Cardiovascular status: blood pressure returned to baseline  Respiratory status: unassisted and spontaneous ventilation  Hydration status: euvolemic  Follow-up not needed.              Vitals Value Taken Time   /78 07/07/25 14:51   Temp 37 °C (98.6 °F) 07/07/25 14:21   Pulse 98 07/07/25 14:51   Resp 18 07/07/25 14:51   SpO2 97 % 07/07/25 14:51         Event Time   Out of Recovery 14:17:29         Pain/Kenn Score: Kenn Score: 10 (7/7/2025  2:51 PM)

## 2025-07-07 NOTE — H&P
HPI  Patient presenting for Procedure(s) (LRB):  INTRACEPT L5 AND S1 RELIEVANT REP (N/A)     Patient on Anti-coagulation No    No health changes since previous encounter    Past Medical History:   Diagnosis Date    Digestive disorder     Sleep apnea     cpap use     Past Surgical History:   Procedure Laterality Date    BACK SURGERY      L5=S1 discectomy    HERNIA REPAIR      umbilical hernia repair x2    INJECTION, SPINE, LUMBOSACRAL, TRANSFORAMINAL APPROACH Bilateral 05/26/2025    Procedure: LUMBAR TRANSFORAMINAL BILATERAL L5/S1;  Surgeon: Scott Nick MD;  Location: [facility];  Service: Pain Management;  Laterality: Bilateral;  2 WK F/U [place]    ROTATOR CUFF REPAIR Left     TONSILLECTOMY       Review of patient's allergies indicates:  No Known Allergies   Current Facility-Administered Medications   Medication    0.9% NaCl infusion    ceFAZolin 2 g    lactated ringers infusion    LIDOcaine (PF) 10 mg/ml (1%) injection 5 mg       PMHx, PSHx, Allergies, Medications reviewed in epic    ROS negative except pain complaints in HPI    OBJECTIVE:    /78 (BP Location: Left arm, Patient Position: Lying)   Pulse 79   Temp 97.8 °F (36.6 °C) (Oral)   Resp 18   SpO2 98%     PHYSICAL EXAMINATION:    GENERAL: Well appearing, in no acute distress, alert and oriented x3.  PSYCH:  Mood and affect appropriate.  SKIN: Skin color, texture, turgor normal, no rashes or lesions which will impact the procedure.  CV: RRR with palpation of the radial artery.  PULM: No evidence of respiratory difficulty, symmetric chest rise. Clear to auscultation.  NEURO: Cranial nerves grossly intact.    Plan:    Proceed with procedure as planned Procedure(s) (LRB):  INTRACEPT L5 AND S1 RELIEVANT REP (N/A)    Lindsey Infante  07/07/2025

## 2025-07-07 NOTE — DISCHARGE SUMMARY
Discharge Note  Short Stay      SUMMARY     Admit Date: 7/7/2025    Attending Physician: Gladys Guzman MD PhD    Discharge Physician: Gladys Guzman MD PhD    Discharge Date: 7/7/2025 1:22 PM    Procedure(s) (LRB):  INTRACEPT L5 AND S1 RELIEVANT REP (N/A)    Final Diagnosis: Vertebrogenic low back pain [M54.51]    Disposition: Home or self care    Patient Instructions:   Current Discharge Medication List        CONTINUE these medications which have NOT CHANGED    Details   FLUoxetine 40 MG capsule       omeprazole (PRILOSEC) 40 MG capsule TAKE 1 CAPSULE BY MOUTH DAILY 30 TO 60 MINUTES BEFORE A MEAL      pregabalin (LYRICA) 150 MG capsule Take 1 capsule (150 mg total) by mouth 2 (two) times daily.  Qty: 60 capsule, Refills: 6    Associated Diagnoses: Degeneration of intervertebral disc of lumbar region with discogenic back pain; Lumbar radiculopathy; Degeneration of intervertebral disc of lumbar region with discogenic back pain and lower extremity pain      cyclobenzaprine (FLEXERIL) 10 MG tablet Take 1 tablet (10 mg total) by mouth 3 (three) times daily as needed for Muscle spasms (back spasms).  Qty: 30 tablet, Refills: 3      fluticasone propionate (FLONASE) 50 mcg/actuation nasal spray 2 sprays each nostril Nasally Once a day for 30 days      hydrOXYzine HCL (ATARAX) 25 MG tablet       sumatriptan (IMITREX) 25 MG Tab Take 50 mg by mouth every 2 (two) hours as needed.                 Discharge Diagnosis: Vertebrogenic low back pain [M54.51]  Condition on Discharge: Stable with no complications to procedure   Diet on Discharge: Same as before.  Activity: as per instruction sheet.  Discharge to: Home with a responsible adult.  Follow up: 2-4 weeks       Please call my office or pager at [phone] if experienced any weakness or loss of sensation, fever > 101.5, pain uncontrolled with oral medications, persistent nausea/vomiting/or diarrhea, redness or drainage from the incisions, or any other worrisome concerns. If  physician on call was not reached or could not communicate with our office for any reason please go to the nearest emergency department

## 2025-07-07 NOTE — BRIEF OP NOTE
Discharge Note  Short Stay      SUMMARY     Admit Date: 7/7/2025    Attending Physician: Lindsey Infante      Discharge Physician: Lindsey Infante      Discharge Date: 7/7/2025 11:54 AM    Procedure(s) (LRB):  INTRACEPT L5 AND S1 RELIEVANT REP (N/A)    Final Diagnosis: Vertebrogenic low back pain [M54.51]    Disposition: Home or self care    Patient Instructions:   Current Discharge Medication List        CONTINUE these medications which have NOT CHANGED    Details   FLUoxetine 40 MG capsule       omeprazole (PRILOSEC) 40 MG capsule TAKE 1 CAPSULE BY MOUTH DAILY 30 TO 60 MINUTES BEFORE A MEAL      pregabalin (LYRICA) 150 MG capsule Take 1 capsule (150 mg total) by mouth 2 (two) times daily.  Qty: 60 capsule, Refills: 6    Associated Diagnoses: Degeneration of intervertebral disc of lumbar region with discogenic back pain; Lumbar radiculopathy; Degeneration of intervertebral disc of lumbar region with discogenic back pain and lower extremity pain      cyclobenzaprine (FLEXERIL) 10 MG tablet Take 1 tablet (10 mg total) by mouth 3 (three) times daily as needed for Muscle spasms (back spasms).  Qty: 30 tablet, Refills: 3      fluticasone propionate (FLONASE) 50 mcg/actuation nasal spray 2 sprays each nostril Nasally Once a day for 30 days      hydrOXYzine HCL (ATARAX) 25 MG tablet       sumatriptan (IMITREX) 25 MG Tab Take 50 mg by mouth every 2 (two) hours as needed.                 Discharge Diagnosis: Vertebrogenic low back pain [M54.51]  Condition on Discharge: Stable with no complications to procedure   Diet on Discharge: Same as before.  Activity: as per instruction sheet.  Discharge to: Home with a responsible adult.  Follow up: 2-4 weeks       Please call my office or pager at [phone] if experienced any weakness or loss of sensation, fever > 101.5, pain uncontrolled with oral medications, persistent nausea/vomiting/or diarrhea, redness or drainage from the incisions, or any other worrisome  concerns. If physician on call was not reached or could not communicate with our office for any reason please go to the nearest emergency department

## 2025-07-07 NOTE — OR NURSING
Kenn score of 10, patient is alert, denies pain, denies nausea, maintaining SPO2 on room air. Patient has met criteria and is ready for transfer.

## 2025-07-07 NOTE — OP NOTE
Basivertebral Nerve Ablation (INTRACEPT)    The procedure, risks, benefits, and options were discussed with the patient. There are no contraindications to the procedure. The patent expressed understanding and agreed to the procedure. Informed written consent was obtained prior to the start of the procedure and can be found in the patient's chart.    PATIENT NAME: Alex Washburn   MRN: [id number]     DATE OF PROCEDURE: 07/07/2025    PROCEDURE:  Intracept Procedure at L5 and S1    PRE-OP DIAGNOSIS: Vertebrogenic low back pain [M54.51]     POST-OP DIAGNOSIS: Same    PHYSICIAN: Gladys Guzman MD    ASSISTANTS: Lindsey Infante MD      PREOPERATIVE ANTIBIOTICS: Ancef 2 g IV given 30 minutes prior to incision, see anesthesia record for time.    LOCAL ANESTHETIC INJECTED: 10mL of equal parts Lidocaine-Epinephrine 1%-100,000 and Bupivicaine 0.25%      ESTIMATED BLOOD LOSS: None    COMPLICATIONS: None    OPERATIVE PROCEDURE: The patient was brought to the operating room and general anesthesia with endotracheal intubation was performed. The patient was positioned prone on the table. The back was prepped and draped in the usual sterile fashion. One image intensifier (C-arm) was brought into position, and the L5 and S1 pedicles were identified and marked with a skin marker. The C-arm was then obliqued to visualize the pedicle and superior border of the vertebral body. The site was anesthestized with a mixture of Lidocaine-Epinephrine 1%-100,000 and Bupivacaine 0.25%. A 1 cm paramedian incision was made with a 10-blade scalpel. The osteointroducer was then placed and advanced through the pedicle with AP and lateral fluoroscopy imaging guidance. Once the needle was at the junction of the pedicle and the vertebral body, a lateral image was taken to insure that the cannula was positioned just past the posterior wall of the vertebral body. Through the cannula, a curved and straight stylet was advanced into the vertebral body under  fluoroscopic guidance creating a channel. The radiopaque marker bands on the stylet were identified using AP and lateral images.  After completing the entry into the vertebral body, a radiofrequency probe was inserted through the cannula and advanced under fluoroscopic guidance. The radiopaque marker bands on the probe were identified using AP and lateral images. Then radiofrequency ablation was performed for 7 minutes at the right L5 level and 7 minutes at the left S1 level. Once the ablation was completed, the curved needle and cannula assembly was removed in its entirety.     Post-procedure, all incisions were closed with bandages. Patient was moving lower extremities in recovery and neurological exam was unchanged from pre-procedure.    Dr. Guzman was present during the critical and key portions of the procedure.    Treatment plan was discussed with the patient. Post procedure instructions were reviewed and the patient voiced understanding.    Lindsey Infante MD    I reviewed and edited the fellow's note. I conducted my own interview and physical examination. I agree with the findings. I was present and supervising all critical portions of the procedure.    Gladys Guzman MD PhD

## 2025-07-08 VITALS
RESPIRATION RATE: 18 BRPM | TEMPERATURE: 99 F | SYSTOLIC BLOOD PRESSURE: 118 MMHG | HEART RATE: 98 BPM | OXYGEN SATURATION: 97 % | DIASTOLIC BLOOD PRESSURE: 78 MMHG

## 2025-07-10 ENCOUNTER — HOSPITAL ENCOUNTER (OUTPATIENT)
Dept: PREADMISSION TESTING | Facility: HOSPITAL | Age: 35
Discharge: HOME | End: 2025-07-10
Attending: INTERNAL MEDICINE
Payer: OTHER GOVERNMENT

## 2025-07-10 VITALS — WEIGHT: 200 LBS | HEIGHT: 73 IN | BODY MASS INDEX: 26.51 KG/M2

## 2025-07-10 NOTE — DISCHARGE INSTRUCTIONS
To confirm, Your procedure is scheduled for: 7/15/25    Endoscopy will call the afternoon prior with the final arrival time.      Please report to [facility] via [facility] entrance. Check in at registration desk.    Do not eat anything after midnight the night before procedure.  You may have clear liquids up until 2 hours prior to arrival at hospital.    *Clear liquids include: WATER, GATORADE, CLEAR JUICES (NO PULP), BLACK COFFEE OR TEA.  ONLY if you are diabetic, check your sugar in the morning before your procedure and do not take any diabetic medicines.    TAKE ONLY THESE MEDICATIONS WITH A SMALL SIP OF WATER THE MORNING OF YOUR PROCEDURE:      DO NOT TAKE THESE MEDICATIONS PRIOR to your procedure or per your surgeon's request: ASPIRIN, ALEVE, ADVIL, IBUPROFEN, FISH OIL VITAMIN E, HERBALS  (May take Tylenol)    ONLY if you are prescribed any types of blood thinners such as:  Aspirin, Coumadin, Plavix, Pradaxa, Xarelto, Aggrenox, Effient, Eliquis, Savasya, Brilinta, or any other, ask your surgeon whether you should stop taking them and how long before surgery you should stop.  You may also need to verify with the prescribing physician if it is ok to stop your medication.      INSTRUCTIONS IMPORTANT!!    Do not smoke, vape or drink alcoholic beverages 24 hours prior to your procedure.   If you wear contact lenses, dentures, hearing aids or glasses, bring a container to put them in during surgery and give to a family member for safe keeping.    Please leave all jewelry, piercing's and valuables at home.   ONLY if you wear home oxygen please bring your portable oxygen tank the day of your procedure.   ONLY for patients requiring bowel prep, written instructions will be given by your doctor's office.  Make arrangements in advance for transportation home by a responsible adult.  You must make arrangements for transportation, TAXI'S, UBER'S OR LYFTS ARE NOT ALLOWED.        If you have any questions about  these instructions, call Pre-Op Admit  Nursing at [phone] or the Pre-Op Endoscopy [phone]

## 2025-07-15 ENCOUNTER — ANESTHESIA EVENT (OUTPATIENT)
Dept: SURGERY | Facility: HOSPITAL | Age: 35
End: 2025-07-15
Payer: OTHER GOVERNMENT

## 2025-07-15 ENCOUNTER — ANESTHESIA (OUTPATIENT)
Dept: SURGERY | Facility: HOSPITAL | Age: 35
End: 2025-07-15
Payer: OTHER GOVERNMENT

## 2025-07-15 ENCOUNTER — HOSPITAL ENCOUNTER (OUTPATIENT)
Facility: HOSPITAL | Age: 35
Discharge: HOME | End: 2025-07-15
Attending: INTERNAL MEDICINE | Admitting: INTERNAL MEDICINE
Payer: OTHER GOVERNMENT

## 2025-07-15 VITALS
BODY MASS INDEX: 26.51 KG/M2 | DIASTOLIC BLOOD PRESSURE: 70 MMHG | TEMPERATURE: 98 F | RESPIRATION RATE: 16 BRPM | RESPIRATION RATE: 14 BRPM | WEIGHT: 200 LBS | SYSTOLIC BLOOD PRESSURE: 121 MMHG | OXYGEN SATURATION: 95 % | SYSTOLIC BLOOD PRESSURE: 106 MMHG | OXYGEN SATURATION: 97 % | HEART RATE: 78 BPM | HEART RATE: 88 BPM | HEIGHT: 73 IN | DIASTOLIC BLOOD PRESSURE: 82 MMHG

## 2025-07-15 DIAGNOSIS — K62.5 RECTAL BLEEDING: ICD-10-CM

## 2025-07-15 PROCEDURE — 45390 COLONOSCOPY W/RESECTION: CPT | Performed by: INTERNAL MEDICINE

## 2025-07-15 PROCEDURE — 27201114 HC TRAP (ANY): Performed by: INTERNAL MEDICINE

## 2025-07-15 PROCEDURE — 63600175 PHARM REV CODE 636 W HCPCS: Performed by: NURSE ANESTHETIST, CERTIFIED REGISTERED

## 2025-07-15 PROCEDURE — 43239 EGD BIOPSY SINGLE/MULTIPLE: CPT | Performed by: INTERNAL MEDICINE

## 2025-07-15 PROCEDURE — 27201028 HC NEEDLE, SCLERO: Performed by: INTERNAL MEDICINE

## 2025-07-15 PROCEDURE — 27201089 HC SNARE, DISP (ANY): Performed by: INTERNAL MEDICINE

## 2025-07-15 PROCEDURE — 45380 COLONOSCOPY AND BIOPSY: CPT | Mod: XS | Performed by: INTERNAL MEDICINE

## 2025-07-15 PROCEDURE — 27200043 HC FORCEPS, BIOPSY: Performed by: INTERNAL MEDICINE

## 2025-07-15 PROCEDURE — 37000008 HC ANESTHESIA 1ST 15 MINUTES: Performed by: INTERNAL MEDICINE

## 2025-07-15 PROCEDURE — 37000009 HC ANESTHESIA EA ADD 15 MINS: Performed by: INTERNAL MEDICINE

## 2025-07-15 RX ORDER — PROPOFOL 10 MG/ML
VIAL (ML) INTRAVENOUS
Status: DISCONTINUED | OUTPATIENT
Start: 2025-07-15 | End: 2025-07-15

## 2025-07-15 RX ADMIN — PROPOFOL 120 MG: 10 INJECTION, EMULSION INTRAVENOUS at 08:07

## 2025-07-15 RX ADMIN — PROPOFOL 20 MG: 10 INJECTION, EMULSION INTRAVENOUS at 09:07

## 2025-07-15 RX ADMIN — SODIUM CHLORIDE, SODIUM LACTATE, POTASSIUM CHLORIDE, AND CALCIUM CHLORIDE: .6; .31; .03; .02 INJECTION, SOLUTION INTRAVENOUS at 08:07

## 2025-07-15 NOTE — [employer] PATIENT INSTRUCTIONS
Discharge Summary/Instructions after an Endoscopic Procedure  Patient Name: Alex Washburn  Patient MRN: [id number]  Patient YOB: 1990  Tuesday, July 15, 2025  Steve Christie III, MD  RESTRICTIONS:  During your procedure today, you received medications for sedation.  These   medications may affect your judgment, balance and coordination.  Therefore,   for 24 hours, you have the following restrictions:   - DO NOT drive a car, operate machinery, make legal/financial decisions,   sign important papers or drink alcohol.    ACTIVITY:  Today: no heavy lifting, straining or running due to procedural   sedation/anesthesia.  The following day: return to full activity including work.  DIET:  Eat and drink normally unless instructed otherwise.     TREATMENT FOR COMMON SIDE EFFECTS:  - Mild abdominal pain, nausea, belching, bloating or excessive gas:  rest,   eat lightly and use a heating pad.  - Sore Throat: treat with throat lozenges and/or gargle with warm salt   water.  - Because air was used during the procedure, expelling large amounts of air   from your rectum or belching is normal.  - If a bowel prep was taken, you may not have a bowel movement for 1-3 days.    This is normal.  SYMPTOMS TO WATCH FOR AND REPORT TO YOUR PHYSICIAN:  1. Abdominal pain or bloating, other than gas cramps.  2. Chest pain.  3. Back pain.  4. Signs of infection such as: chills or fever occurring within 24 hours   after the procedure.  5. Rectal bleeding, which would show as bright red, maroon, or black stools.   (A tablespoon of blood from the rectum is not serious, especially if   hemorrhoids are present.)  6. Vomiting.  7. Weakness or dizziness.  GO DIRECTLY TO THE NEAREST EMERGENCY ROOM IF YOU HAVE ANY OF THE FOLLOWING:      Difficulty breathing              Chills and/or fever over 101 F   Persistent vomiting and/or vomiting blood   Severe abdominal pain   Severe chest pain   Black, tarry stools   Bleeding- more than one  tablespoon   Any other symptom or condition that you feel may need urgent attention  Your doctor recommends these additional instructions:  If any biopsies were taken, your doctors clinic will contact you in 1 to 2   weeks with any results.  - Patient has a contact number available for emergencies.  The signs and   symptoms of potential delayed complications were discussed with the   patient.  Return to normal activities tomorrow.  Written discharge   instructions were provided to the patient.   - Discharge patient to home (with escort).  For questions, problems or results please call your physician - Steve Christie III, MD at Work:  [phone].  [facility], EMERGENCY ROOM PHONE NUMBER: [phone]  IF A COMPLICATION OR EMERGENCY SITUATION ARISES AND YOU ARE UNABLE TO REACH   YOUR PHYSICIAN - GO DIRECTLY TO THE EMERGENCY ROOM.  Steve Christie III, MD  7/15/2025 9:29:40 AM  This report has been verified and signed electronically.  Dear patient,  As a result of recent federal legislation (The Federal Cures Act), you may   receive lab or pathology results from your procedure in your [facility]   account before your physician is able to contact you. Your physician or   their representative will relay the results to you with their   recommendations at their soonest availability.  Thank you,  [employer]

## 2025-07-15 NOTE — H&P
GASTROENTEROLOGY PRE-PROCEDURE H&P NOTE  Patient Name: Alex Washburn  Patient MRN: [id number]  Patient : 1990    Service date: 7/15/2025    PCP: Desiree, Primary Doctor    No chief complaint on file.      HPI: Patient is a 35 y.o. male with PMHx as below here for evaluation of abd pain, bowel variation, rectal bleeding.     Past Medical History:  Past Medical History:   Diagnosis Date    Digestive disorder     Sleep apnea     cpap use        Past Surgical History:  Past Surgical History:   Procedure Laterality Date    BACK SURGERY      L5=S1 discectomy    DESTRUCTION, INTRAOSSEOUS BASIVERTEBRAL NERVE, 1ST TWO BODIES, LUM/SAC N/A 2025    Procedure: INTRACEPT L5 AND S1 RELIEVANT REP;  Surgeon: Gladys Guzman MD;  Location: [facility] OR;  Service: Pain Management;  Laterality: N/A;    HERNIA REPAIR      umbilical hernia repair x2    INJECTION, SPINE, LUMBOSACRAL, TRANSFORAMINAL APPROACH Bilateral 2025    Procedure: LUMBAR TRANSFORAMINAL BILATERAL L5/S1;  Surgeon: Scott Nick MD;  Location: [facility] PAIN MGT;  Service: Pain Management;  Laterality: Bilateral;  2 WK F/U [place]    ROTATOR CUFF REPAIR Left     TONSILLECTOMY          Home Medications:  Prescriptions Prior to Admission[1]            Review of patient's allergies indicates:  No Known Allergies    Social History:   Social History     Occupational History    Not on file   Tobacco Use    Smoking status: Never    Smokeless tobacco: Never    Tobacco comments:     Can a week   Substance and Sexual Activity    Alcohol use: Not Currently    Drug use: Never    Sexual activity: Yes     Partners: Female       Family History:   No family history on file.    Review of Systems:  A 10 point review of systems was performed and was normal, except as mentioned in the HPI, including constitutional, HEENT, heme, lymph, cardiovascular, respiratory, gastrointestinal, genitourinary, neurologic, endocrine, psychiatric and musculoskeletal.      OBJECTIVE:    Physical Exam:  24  "Hour Vital Sign Ranges: Temp:  [98.7 °F (37.1 °C)] 98.7 °F (37.1 °C)  Pulse:  [78] 78  Resp:  [16] 16  SpO2:  [98 %] 98 %  BP: (125)/(88) 125/88  Most recent vitals: /88 (BP Location: Left arm, Patient Position: Lying)   Pulse 78   Temp 98.7 °F (37.1 °C) (Temporal)   Resp 16   Ht 6' 1" (1.854 m)   Wt 90.7 kg (200 lb)   SpO2 98%   BMI 26.39 kg/m²    GEN: well-developed, well-nourished, awake and alert, non-toxic appearing adult  HEENT: PERRL, sclera anicteric, oral mucosa pink and moist without lesion  NECK: trachea midline; Good ROM  CV: regular rate and rhythm, no murmurs or gallops  RESP: clear to auscultation bilaterally, no wheezes, rhonci or rales  ABD: soft, non-tender, non-distended, normal bowel sounds  EXT: no swelling or edema, 2+ pulses distally  SKIN: no rashes or jaundice  PSYCH: normal affect    Labs:   No results for input(s): "WBC", "MCV", "PLT" in the last 72 hours.    Invalid input(s): "HGBAU"  No results for input(s): "NA", "K", "CL", "CO2", "BUN", "GLU" in the last 72 hours.    Invalid input(s): "CREA"  No results for input(s): "ALB" in the last 72 hours.    Invalid input(s): "ALKP", "SGOT", "SGPT", "TBIL", "DBIL", "TPRO"  No results for input(s): "PT", "INR", "PTT" in the last 72 hours.      IMPRESSION / RECOMMENDATIONS:  EGD and Colon +/- hemorrhoid bandings with interventions as warranted.   RIsks, benefits, alternatives discussed in detail regarding upcoming procedures and sedation. Some of the more common endoscopic complications include but not limited to immediate or delayed perforation, bleeding, infections, pain, inadvertent injury to surrounding tissue / organs and possible need for surgical evaluation. Patient expressed understanding, all questions answered and will proceed with procedure as planned.     Edward W Dauterive III  7/15/2025  8:53 AM           [1]   Medications Prior to Admission   Medication Sig Dispense Refill Last Dose/Taking    cyclobenzaprine (FLEXERIL) " 10 MG tablet Take 1 tablet (10 mg total) by mouth 3 (three) times daily as needed for Muscle spasms (back spasms). 30 tablet 3     FLUoxetine 40 MG capsule        fluticasone propionate (FLONASE) 50 mcg/actuation nasal spray 2 sprays each nostril Nasally Once a day for 30 days       omeprazole (PRILOSEC) 40 MG capsule TAKE 1 CAPSULE BY MOUTH DAILY 30 TO 60 MINUTES BEFORE A MEAL       pregabalin (LYRICA) 150 MG capsule Take 1 capsule (150 mg total) by mouth 2 (two) times daily. 60 capsule 6     sumatriptan (IMITREX) 25 MG Tab Take 50 mg by mouth every 2 (two) hours as needed.

## 2025-07-15 NOTE — [employer] PATIENT INSTRUCTIONS
Discharge Summary/Instructions after an Endoscopic Procedure  Patient Name: Alex Washburn  Patient MRN: [id number]  Patient YOB: 1990  Tuesday, July 15, 2025  Steve Christie III, MD  RESTRICTIONS:  During your procedure today, you received medications for sedation.  These   medications may affect your judgment, balance and coordination.  Therefore,   for 24 hours, you have the following restrictions:   - DO NOT drive a car, operate machinery, make legal/financial decisions,   sign important papers or drink alcohol.    ACTIVITY:  Today: no heavy lifting, straining or running due to procedural   sedation/anesthesia.  The following day: return to full activity including work.  DIET:  Eat and drink normally unless instructed otherwise.     TREATMENT FOR COMMON SIDE EFFECTS:  - Mild abdominal pain, nausea, belching, bloating or excessive gas:  rest,   eat lightly and use a heating pad.  - Sore Throat: treat with throat lozenges and/or gargle with warm salt   water.  - Because air was used during the procedure, expelling large amounts of air   from your rectum or belching is normal.  - If a bowel prep was taken, you may not have a bowel movement for 1-3 days.    This is normal.  SYMPTOMS TO WATCH FOR AND REPORT TO YOUR PHYSICIAN:  1. Abdominal pain or bloating, other than gas cramps.  2. Chest pain.  3. Back pain.  4. Signs of infection such as: chills or fever occurring within 24 hours   after the procedure.  5. Rectal bleeding, which would show as bright red, maroon, or black stools.   (A tablespoon of blood from the rectum is not serious, especially if   hemorrhoids are present.)  6. Vomiting.  7. Weakness or dizziness.  GO DIRECTLY TO THE NEAREST EMERGENCY ROOM IF YOU HAVE ANY OF THE FOLLOWING:      Difficulty breathing              Chills and/or fever over 101 F   Persistent vomiting and/or vomiting blood   Severe abdominal pain   Severe chest pain   Black, tarry stools   Bleeding- more than one  tablespoon   Any other symptom or condition that you feel may need urgent attention  Your doctor recommends these additional instructions:  If any biopsies were taken, your doctors clinic will contact you in 1 to 2   weeks with any results.  - Discharge patient to home (ambulatory).   - Patient has a contact number available for emergencies.  The signs and   symptoms of potential delayed complications were discussed with the   patient.  Return to normal activities tomorrow.  Written discharge   instructions were provided to the patient.   - Resume previous diet.   - Continue present medications.   - Repeat colonoscopy in 3 years for surveillance.   - Concerns for mild crohn's disease w/ ileitis; check calpro pending   biopsies  For questions, problems or results please call your physician - Steve Christie III, MD at Work:  [phone].  formerly [facility], EMERGENCY ROOM PHONE NUMBER: [phone]  IF A COMPLICATION OR EMERGENCY SITUATION ARISES AND YOU ARE UNABLE TO REACH   YOUR PHYSICIAN - GO DIRECTLY TO THE EMERGENCY ROOM.  Steve Christie III, MD  7/15/2025 9:33:23 AM  This report has been verified and signed electronically.  Dear patient,  As a result of recent federal legislation (The Federal Cures Act), you may   receive lab or pathology results from your procedure in your [facility]   account before your physician is able to contact you. Your physician or   their representative will relay the results to you with their   recommendations at their soonest availability.  Thank you,  [employer]

## 2025-07-15 NOTE — ANESTHESIA PREPROCEDURE EVALUATION
07/15/2025  Alex Washburn is a 35 y.o., male.    Problem List[1]    Past Surgical History:   Procedure Laterality Date    BACK SURGERY      L5=S1 discectomy    DESTRUCTION, INTRAOSSEOUS BASIVERTEBRAL NERVE, 1ST TWO BODIES, LUM/SAC N/A 7/7/2025    Procedure: INTRACEPT L5 AND S1 RELIEVANT REP;  Surgeon: Gladys Guzman MD;  Location: [facility], [place], operated by [facility] OR;  Service: Pain Management;  Laterality: N/A;    HERNIA REPAIR      umbilical hernia repair x2    INJECTION, SPINE, LUMBOSACRAL, TRANSFORAMINAL APPROACH Bilateral 05/26/2025    Procedure: LUMBAR TRANSFORAMINAL BILATERAL L5/S1;  Surgeon: Scott Nick MD;  Location: [facility], [place], operated by [facility] PAIN MGT;  Service: Pain Management;  Laterality: Bilateral;  2 WK F/U [facility]    ROTATOR CUFF REPAIR Left     TONSILLECTOMY          Tobacco Use:  The patient  reports that he has never smoked. He has never used smokeless tobacco.     Results for orders placed or performed during the hospital encounter of 07/02/25   EKG 12-lead    Collection Time: 07/02/25 10:48 AM   Result Value Ref Range    QRS Duration 72 ms    OHS QTC Calculation 441 ms    Narrative    Test Reason : Z01.818,    Vent. Rate : 110 BPM     Atrial Rate : 110 BPM     P-R Int : 132 ms          QRS Dur :  72 ms      QT Int : 326 ms       P-R-T Axes :  33  28  50 degrees    QTcB Int : 441 ms    Sinus tachycardia  Otherwise normal ECG    Confirmed by FENG Gutierrez ([id number]) on 7/2/2025 3:46:28 PM    Referred By:            Confirmed By: FENG Gutierrez             Lab Results   Component Value Date    WBC 11.23 07/02/2025    HGB 15.4 07/02/2025    HCT 43.8 07/02/2025    MCV 83 07/02/2025     07/02/2025     BMP  Lab Results   Component Value Date     07/02/2025    K 3.9 07/02/2025     07/02/2025    CO2 21 (L) 07/02/2025    BUN 10 07/02/2025    CREATININE 1.0 07/02/2025    CALCIUM 9.4 07/02/2025    ANIONGAP 11 07/02/2025      07/02/2025     05/03/2024       No results found for this or any previous visit.           Pre-op Assessment    I have reviewed the Patient Summary Reports.     I have reviewed the Nursing Notes. I have reviewed the NPO Status.   I have reviewed the Medications.     Review of Systems  Anesthesia Hx:  No problems with previous Anesthesia   Neg history of prior surgery.          Denies Family Hx of Anesthesia complications.    Denies Personal Hx of Anesthesia complications.                    Social:  Non-Smoker, No Alcohol Use       Hematology/Oncology:  Hematology Normal   Oncology Normal                                   EENT/Dental:  EENT/Dental Normal           Cardiovascular:  Cardiovascular Normal                  ECG has been reviewed. Sinus tachycardia                           Pulmonary:        Sleep Apnea Compliant with CPAP use.          Education provided regarding risk of obstructive sleep apnea            Renal/:  Renal/ Normal                 Hepatic/GI:    Hiatal Hernia, GERD, well controlled   Rectal bleeding             Musculoskeletal:         Spine Disorders: lumbar            Neurological:      Headaches                                 Endocrine:  Endocrine Normal            Dermatological:  Skin Normal    Psych:  Psychiatric Normal                    Physical Exam  General: Well nourished and Alert    Airway:  Mallampati: II   Mouth Opening: Normal  TM Distance: Normal  Tongue: Normal  Neck ROM: Normal ROM    Chest/Lungs:  Clear to auscultation, Normal Respiratory Rate    Heart:  Rate: Normal  Rhythm: Regular Rhythm  Sounds: Normal        Anesthesia Plan  Type of Anesthesia, risks & benefits discussed:    Anesthesia Type: Gen Natural Airway  Intra-op Monitoring Plan: Standard ASA Monitors  Post Op Pain Control Plan: multimodal analgesia  Induction:  IV  Informed Consent: Informed consent signed with the Patient and all parties understand the risks and agree with anesthesia plan.  All  questions answered. Patient consented to blood products? Yes  ASA Score: 3    Ready For Surgery From Anesthesia Perspective.     .           [1]   Patient Active Problem List  Diagnosis    Nausea    Migraine without status migrainosus, not intractable    Lumbar radiculopathy

## 2025-07-15 NOTE — ANESTHESIA POSTPROCEDURE EVALUATION
Anesthesia Post Evaluation    Patient: Alex Washburn    Procedure(s) Performed: Procedure(s) (LRB):  EGD (ESOPHAGOGASTRODUODENOSCOPY) (N/A)  COLONOSCOPY (N/A)    Final Anesthesia Type: general      Patient location during evaluation: GI PACU  Patient participation: Yes- Able to Participate  Level of consciousness: awake and alert  Post-procedure vital signs: reviewed and stable  Pain management: adequate  Airway patency: patent    PONV status at discharge: No PONV  Anesthetic complications: no      Cardiovascular status: blood pressure returned to baseline and stable  Respiratory status: unassisted and room air  Hydration status: euvolemic  Follow-up not needed.              Vitals Value Taken Time   /82 07/15/25 09:50   Temp 36.6 °C (97.8 °F) 07/15/25 09:49   Pulse 73 07/15/25 09:56   Resp 16 07/15/25 09:49   SpO2 100 % 07/15/25 09:56   Vitals shown include unfiled device data.      Event Time   Out of Recovery 09:54:34         Pain/Kenn Score: No data recorded

## 2025-07-15 NOTE — TRANSFER OF CARE
"Anesthesia Transfer of Care Note    Patient: Alex Washburn    Procedure(s) Performed: Procedure(s) (LRB):  EGD (ESOPHAGOGASTRODUODENOSCOPY) (N/A)  COLONOSCOPY (N/A)    Patient location: GI    Anesthesia Type: general    Transport from OR: Transported from OR on room air with adequate spontaneous ventilation    Post pain: adequate analgesia    Post assessment: no apparent anesthetic complications and tolerated procedure well    Post vital signs: stable    Level of consciousness: sedated    Nausea/Vomiting: no nausea/vomiting    Complications: none    Transfer of care protocol was followed      Last vitals: Visit Vitals  /88 (BP Location: Left arm, Patient Position: Lying)   Pulse 78   Temp 37.1 °C (98.7 °F) (Temporal)   Resp 16   Ht 6' 1" (1.854 m)   Wt 90.7 kg (200 lb)   SpO2 98%   BMI 26.39 kg/m²     "

## 2025-07-24 ENCOUNTER — LAB VISIT (OUTPATIENT)
Dept: LAB | Facility: OTHER | Age: 35
End: 2025-07-24
Payer: OTHER GOVERNMENT

## 2025-07-24 DIAGNOSIS — K52.9 ILEITIS: Primary | ICD-10-CM

## 2025-07-24 LAB — CRP SERPL-MCNC: 0.9 MG/L

## 2025-07-24 PROCEDURE — 36415 COLL VENOUS BLD VENIPUNCTURE: CPT

## 2025-07-24 PROCEDURE — 83993 ASSAY FOR CALPROTECTIN FECAL: CPT

## 2025-07-24 PROCEDURE — 86140 C-REACTIVE PROTEIN: CPT

## 2025-07-25 LAB
CALPROTECTIN INTERP (OHS): NORMAL
CALPROTECTIN STOOL (OHS): 41.9 ΜG/G

## 2025-08-04 DIAGNOSIS — Z13.71 GENETIC DISEASE CARRIER STATUS TESTING, MALE: ICD-10-CM

## 2025-08-04 DIAGNOSIS — Z84.81 FAMILY HISTORY OF CARRIER OF GENETIC DISEASE: Primary | ICD-10-CM

## 2025-08-04 NOTE — TELEPHONE ENCOUNTER
Spoke with Alex on the phone and discussed carrier screening for Zellweger syndrome, given the results from his partner Shaye. We discussed the benefits and limitations of carrier screening and reviewed recessive inheritance patterns. After our discussion, Alex elected to proceed with the [employer] Plus screen. All questions were answered.

## 2025-08-21 ENCOUNTER — OFFICE VISIT (OUTPATIENT)
Dept: PAIN MEDICINE | Facility: CLINIC | Age: 35
End: 2025-08-21
Payer: OTHER GOVERNMENT

## 2025-08-21 VITALS
TEMPERATURE: 98 F | HEART RATE: 87 BPM | DIASTOLIC BLOOD PRESSURE: 87 MMHG | HEIGHT: 73 IN | BODY MASS INDEX: 29.27 KG/M2 | WEIGHT: 220.88 LBS | OXYGEN SATURATION: 100 % | SYSTOLIC BLOOD PRESSURE: 126 MMHG | RESPIRATION RATE: 18 BRPM

## 2025-08-21 DIAGNOSIS — M54.51 VERTEBROGENIC LOW BACK PAIN: ICD-10-CM

## 2025-08-21 DIAGNOSIS — M51.360 DEGENERATION OF INTERVERTEBRAL DISC OF LUMBAR REGION WITH DISCOGENIC BACK PAIN: ICD-10-CM

## 2025-08-21 DIAGNOSIS — M53.3 CHRONIC LEFT SACROILIAC JOINT PAIN: ICD-10-CM

## 2025-08-21 DIAGNOSIS — M54.16 LUMBAR RADICULOPATHY: ICD-10-CM

## 2025-08-21 DIAGNOSIS — M54.2 NECK PAIN: Primary | ICD-10-CM

## 2025-08-21 DIAGNOSIS — M51.362 DEGENERATION OF INTERVERTEBRAL DISC OF LUMBAR REGION WITH DISCOGENIC BACK PAIN AND LOWER EXTREMITY PAIN: ICD-10-CM

## 2025-08-21 DIAGNOSIS — M53.3 CHRONIC RIGHT SACROILIAC JOINT PAIN: ICD-10-CM

## 2025-08-21 DIAGNOSIS — G89.29 CHRONIC RIGHT SACROILIAC JOINT PAIN: ICD-10-CM

## 2025-08-21 DIAGNOSIS — M53.3 CHRONIC RIGHT SACROILIAC JOINT PAIN: Primary | ICD-10-CM

## 2025-08-21 DIAGNOSIS — G89.29 CHRONIC LEFT SACROILIAC JOINT PAIN: ICD-10-CM

## 2025-08-21 DIAGNOSIS — G89.29 OTHER CHRONIC PAIN: ICD-10-CM

## 2025-08-21 DIAGNOSIS — G89.29 CHRONIC RIGHT SACROILIAC JOINT PAIN: Primary | ICD-10-CM

## 2025-08-21 PROCEDURE — 99999 PR PBB SHADOW E&M-EST. PATIENT-LVL IV: CPT | Mod: PBBFAC,,,

## 2025-08-21 PROCEDURE — 99214 OFFICE O/P EST MOD 30 MIN: CPT | Mod: S$PBB,,,

## 2025-08-21 PROCEDURE — 99214 OFFICE O/P EST MOD 30 MIN: CPT | Mod: PBBFAC

## 2025-08-21 RX ORDER — DICLOFENAC SODIUM 10 MG/G
2 GEL TOPICAL 3 TIMES DAILY PRN
Qty: 150 G | Refills: 1 | Status: SHIPPED | OUTPATIENT
Start: 2025-08-21

## (undated) DEVICE — SUT 3-0 VICRYL SH CR/8 18

## (undated) DEVICE — NDL SPINAL SPINOCAN 22GX3.5

## (undated) DEVICE — ADHESIVE DERMABOND ADVANCED

## (undated) DEVICE — Device

## (undated) DEVICE — UNDERGLOVES BIOGEL PI SIZE 7.5

## (undated) DEVICE — DRAPE LAP T SHT W/ INSTR PAD

## (undated) DEVICE — CHLORAPREP W TINT 26ML APPL

## (undated) DEVICE — BRUSH SCRUB HIBICLENS 4%

## (undated) DEVICE — DRAPE C-ARMOR EQUIPMENT COVER

## (undated) DEVICE — CLIPPER BLADE MOD 4406 (CAREF)

## (undated) DEVICE — NDL HYPO REG 25G X 1 1/2

## (undated) DEVICE — SUT MONOCRYL 4-0 PS-2

## (undated) DEVICE — SPONGE COTTON TRAY 4X4IN

## (undated) DEVICE — DRAPE STERI-DRAPE 1000 17X11IN

## (undated) DEVICE — DRAPE INCISE IOBAN 2 23X17IN

## (undated) DEVICE — DRESSING MEDIPORE PAD 3.5X4IN

## (undated) DEVICE — DRESSING AQUACEL AG ADV 3.5X6

## (undated) DEVICE — GLOVE BIOGEL SKINSENSE PI 7.0

## (undated) DEVICE — SYR B-D DISP CONTROL 10CC100/C